# Patient Record
Sex: MALE | Race: WHITE
[De-identification: names, ages, dates, MRNs, and addresses within clinical notes are randomized per-mention and may not be internally consistent; named-entity substitution may affect disease eponyms.]

---

## 2020-04-24 ENCOUNTER — HOSPITAL ENCOUNTER (INPATIENT)
Dept: HOSPITAL 97 - ER | Age: 85
LOS: 2 days | Discharge: HOME | DRG: 871 | End: 2020-04-26
Attending: FAMILY MEDICINE | Admitting: HOSPITALIST
Payer: COMMERCIAL

## 2020-04-24 VITALS — BODY MASS INDEX: 23.6 KG/M2

## 2020-04-24 DIAGNOSIS — N28.89: ICD-10-CM

## 2020-04-24 DIAGNOSIS — N18.9: ICD-10-CM

## 2020-04-24 DIAGNOSIS — Z79.82: ICD-10-CM

## 2020-04-24 DIAGNOSIS — R79.89: ICD-10-CM

## 2020-04-24 DIAGNOSIS — Z88.1: ICD-10-CM

## 2020-04-24 DIAGNOSIS — N17.9: ICD-10-CM

## 2020-04-24 DIAGNOSIS — D72.829: ICD-10-CM

## 2020-04-24 DIAGNOSIS — A41.9: Primary | ICD-10-CM

## 2020-04-24 DIAGNOSIS — Z86.73: ICD-10-CM

## 2020-04-24 DIAGNOSIS — I48.20: ICD-10-CM

## 2020-04-24 DIAGNOSIS — J18.9: ICD-10-CM

## 2020-04-24 DIAGNOSIS — J81.1: ICD-10-CM

## 2020-04-24 DIAGNOSIS — D64.9: ICD-10-CM

## 2020-04-24 DIAGNOSIS — I16.1: ICD-10-CM

## 2020-04-24 DIAGNOSIS — Z20.828: ICD-10-CM

## 2020-04-24 DIAGNOSIS — R65.21: ICD-10-CM

## 2020-04-24 DIAGNOSIS — R31.9: ICD-10-CM

## 2020-04-24 LAB
ALBUMIN SERPL BCP-MCNC: 2.7 G/DL (ref 3.4–5)
ALP SERPL-CCNC: 68 U/L (ref 45–117)
ALT SERPL W P-5'-P-CCNC: 15 U/L (ref 12–78)
AST SERPL W P-5'-P-CCNC: 31 U/L (ref 15–37)
BLD SMEAR INTERP: (no result)
BUN BLD-MCNC: 19 MG/DL (ref 7–18)
COHGB MFR BLDA: 1.7 % (ref 0–1.5)
GLUCOSE SERPLBLD-MCNC: 119 MG/DL (ref 74–106)
HCT VFR BLD CALC: 32.8 % (ref 39.6–49)
INR BLD: 1.23
LIPASE SERPL-CCNC: 82 U/L (ref 73–393)
LYMPHOCYTES # SPEC AUTO: 0.5 K/UL (ref 0.7–4.9)
MAGNESIUM SERPL-MCNC: 1.6 MG/DL (ref 1.8–2.4)
MORPHOLOGY BLD-IMP: (no result)
OXYHGB MFR BLDA: 97.3 % (ref 94–97)
PMV BLD: 8.7 FL (ref 7.6–11.3)
POTASSIUM SERPL-SCNC: 4 MMOL/L (ref 3.5–5.1)
RBC # BLD: 3.68 M/UL (ref 4.33–5.43)
SAO2 % BLDA: 99.6 % (ref 92–98.5)
TROPONIN (EMERG DEPT USE ONLY): 0.06 NG/ML (ref 0–0.04)
UA COMPLETE W REFLEX CULTURE PNL UR: (no result)

## 2020-04-24 PROCEDURE — 84443 ASSAY THYROID STIM HORMONE: CPT

## 2020-04-24 PROCEDURE — 80053 COMPREHEN METABOLIC PANEL: CPT

## 2020-04-24 PROCEDURE — 85610 PROTHROMBIN TIME: CPT

## 2020-04-24 PROCEDURE — 83605 ASSAY OF LACTIC ACID: CPT

## 2020-04-24 PROCEDURE — 84100 ASSAY OF PHOSPHORUS: CPT

## 2020-04-24 PROCEDURE — 93005 ELECTROCARDIOGRAM TRACING: CPT

## 2020-04-24 PROCEDURE — 85018 HEMOGLOBIN: CPT

## 2020-04-24 PROCEDURE — 85730 THROMBOPLASTIN TIME PARTIAL: CPT

## 2020-04-24 PROCEDURE — 96372 THER/PROPH/DIAG INJ SC/IM: CPT

## 2020-04-24 PROCEDURE — 80076 HEPATIC FUNCTION PANEL: CPT

## 2020-04-24 PROCEDURE — 97161 PT EVAL LOW COMPLEX 20 MIN: CPT

## 2020-04-24 PROCEDURE — 76770 US EXAM ABDO BACK WALL COMP: CPT

## 2020-04-24 PROCEDURE — 83880 ASSAY OF NATRIURETIC PEPTIDE: CPT

## 2020-04-24 PROCEDURE — 36415 COLL VENOUS BLD VENIPUNCTURE: CPT

## 2020-04-24 PROCEDURE — 82805 BLOOD GASES W/O2 SATURATION: CPT

## 2020-04-24 PROCEDURE — 83735 ASSAY OF MAGNESIUM: CPT

## 2020-04-24 PROCEDURE — 81003 URINALYSIS AUTO W/O SCOPE: CPT

## 2020-04-24 PROCEDURE — 87086 URINE CULTURE/COLONY COUNT: CPT

## 2020-04-24 PROCEDURE — 83690 ASSAY OF LIPASE: CPT

## 2020-04-24 PROCEDURE — 82550 ASSAY OF CK (CPK): CPT

## 2020-04-24 PROCEDURE — 84132 ASSAY OF SERUM POTASSIUM: CPT

## 2020-04-24 PROCEDURE — 70450 CT HEAD/BRAIN W/O DYE: CPT

## 2020-04-24 PROCEDURE — 84484 ASSAY OF TROPONIN QUANT: CPT

## 2020-04-24 PROCEDURE — 8E0ZXY6 ISOLATION: ICD-10-PCS

## 2020-04-24 PROCEDURE — 84145 PROCALCITONIN (PCT): CPT

## 2020-04-24 PROCEDURE — 85014 HEMATOCRIT: CPT

## 2020-04-24 PROCEDURE — 87088 URINE BACTERIA CULTURE: CPT

## 2020-04-24 PROCEDURE — 84439 ASSAY OF FREE THYROXINE: CPT

## 2020-04-24 PROCEDURE — 81015 MICROSCOPIC EXAM OF URINE: CPT

## 2020-04-24 PROCEDURE — 97530 THERAPEUTIC ACTIVITIES: CPT

## 2020-04-24 PROCEDURE — 85379 FIBRIN DEGRADATION QUANT: CPT

## 2020-04-24 PROCEDURE — 93970 EXTREMITY STUDY: CPT

## 2020-04-24 PROCEDURE — 71045 X-RAY EXAM CHEST 1 VIEW: CPT

## 2020-04-24 PROCEDURE — 71275 CT ANGIOGRAPHY CHEST: CPT

## 2020-04-24 PROCEDURE — 85025 COMPLETE CBC W/AUTO DIFF WBC: CPT

## 2020-04-24 PROCEDURE — 94660 CPAP INITIATION&MGMT: CPT

## 2020-04-24 PROCEDURE — 99291 CRITICAL CARE FIRST HOUR: CPT

## 2020-04-24 PROCEDURE — 99292 CRITICAL CARE ADDL 30 MIN: CPT

## 2020-04-24 PROCEDURE — 80048 BASIC METABOLIC PNL TOTAL CA: CPT

## 2020-04-24 PROCEDURE — 87804 INFLUENZA ASSAY W/OPTIC: CPT

## 2020-04-24 PROCEDURE — 87040 BLOOD CULTURE FOR BACTERIA: CPT

## 2020-04-24 RX ADMIN — Medication SCH ML: at 21:59

## 2020-04-24 RX ADMIN — ACETAMINOPHEN SCH MG: 500 TABLET, FILM COATED ORAL at 22:06

## 2020-04-24 NOTE — RAD REPORT
EXAM DESCRIPTION:  CT - Head Brain Wo Cont - 4/24/2020 6:22 pm

 

CLINICAL HISTORY:  MENTAL STATUS CHANGE, AMS, fever, history of CVA

 

COMPARISON:  MRI BRAIN WITHOUT CONTRAST dated 6/11/2015

 

TECHNIQUE:  Axial 5 mm thick images of the head were obtained without IV contrast.

 

All CT scans are performed using dose optimization technique as appropriate and may include automated
 exposure control or mA/KV adjustment according to patient size.

 

FINDINGS:  No intracranial hemorrhage, mass, edema or shift of mid-line structures. No acute infarcti
on changes seen. No cortical edema or sulcal effacement. Moderate severity atrophy present. Chronic i
schemic changes are present relatively mild. Old ischemic changes are present in the posterolateral l
eft thalamus. This is similar to the MRI study. Ventricles are in proportion to volume loss. Vascular
 tortuosity noted. Arterial and physiologic calcifications present.

 

Mastoid air cells and visualized portions of the paranasal sinuses are clear.

 

No acute bony findings.

 

 

IMPRESSION:  No hemorrhage is present. No acute cortical based infarction.

 

Atrophy and chronic ischemic changes are present but not substantially different from 2015 MRI.

 

 Chronic ischemic changes can mask nonhemorrhagic acute infarction. MR brain followup can be obtained
 if there is ongoing concern for acute ischemia.

## 2020-04-24 NOTE — RAD REPORT
EXAM DESCRIPTION:  RAD - Chest Single View - 4/24/2020 3:32 pm

 

CLINICAL HISTORY:  FEVER

 

COMPARISON:  September 2006

 

TECHNIQUE:  AP portable chest image was obtained 4/24/2020 3:32 pm .

 

FINDINGS:  Lung volumes are low. Interstitial and minimal alveolar opacities are present. Cardiomegal
y is present. Vascular engorgement is seen. Minimal opacification partially obscures the right hemidi
aphragm. No pneumothorax or large pleural effusion. No acute bony abnormality seen. No acute aortic f
indings suspected.

 

IMPRESSION:  Limited shallow inspiration exam shows failure/ volume overload findings. This needs cor
relation with clinical presentation.

 

No focal consolidations seen. Right base assessment is limited. Retrocardiac left base assessment als
o limited.

## 2020-04-24 NOTE — RAD REPORT
EXAM DESCRIPTION:  CT - Chest For Pe Angio - 4/24/2020 6:22 pm

 

CLINICAL HISTORY:   elevated dd;Fever

 

COMPARISON:  No comparisons

 

TECHNIQUE:  Dynamically enhanced 3 mm thick images of the chest were obtained during administration o
f approximately 150mL Isovue 370 IV contrast. Coronal and oblique MIP reconstruction images were gene
rated and reviewed. Exam utilizes a protocol to evaluate the pulmonary arterial tree.

 

All CT scans are performed using dose optimization technique as appropriate and may include automated
 exposure control or mA/KV adjustment according to patient size.

 

FINDINGS:  No pulmonary emboli are identified.

 

Ascending aorta is 4.2 cm in diameter. Aortic arch distal to the left subclavian artery origin is dil
ated to 4.7 cm. No displaced calcifications. Full assessment of the aorta is limited on a PE protocol
 study.

 

 Cardiomegaly is present without pericardial effusion.

 

Emphysema changes are present in the upper lung fields. Minimal atelectasis in the posterior gutter o
n the left. Atelectasis and consolidation are present in the posterior gutter on the right. A few air
 bronchograms are present. No ground-glass opacification. No measurable pleural effusion. There is no
 pneumothorax.

 

No mediastinal or hilar suspicious masses. No chest wall masses or abnormal axillary lymphadenopathy.


Upper abdominal imaging is limited. There is a 4.9 centimeter round low-density mass believed to be a
n upper pole left renal cyst. A much larger 12 centimeter lobulated mass is present in the location o
f the right kidney displacing the liver. This has variable attenuation. The right kidney is insuffici
ently image to allow assessment. Most of the large lobulated mass is low density. This could be a dys
plastic multi-cystic kidney. A large malignancy cannot be excluded. This finding warrants follow-up. 
However, that follow-up may need to be delayed. The patient has received contrast for this study.

 

IMPRESSION:  No pulmonary emboli identified.

 

Small posterior right lung base pneumonia. Bacterial pneumonia suspected. Lung parenchymal pattern is
 not a typical COVID-19 presentation.

 

Large lobulated 12 centimeter mass in the right renal fossa only partially imaged. This could be in m
ulti-cystic dysplastic kidney. Malignancy cannot be excluded.

 

Follow-up CT abdomen imaging will be needed. However, this needs to be delayed for optimal imaging. T
he presence of contrast from this study limits the ability to perform intermediate evaluation of the 
kidney.

 

Cardiomegaly.

## 2020-04-24 NOTE — ER
Nurse's Notes                                                                                     

 [facility]                                                                 

Name: Brett Hernandez                                                                                 

Age: 84 yrs                                                                                       

Sex: Male                                                                                         

: 1935                                                                                   

MRN: [id number]                                                                                   

Arrival Date: 2020                                                                          

Time: 14:48                                                                                       

Account#: [id number]                                                                            

Bed 4                                                                                             

Private MD:                                                                                       

Diagnosis: Atrial Fibrillation with Rapid Ventricular rate;Fever, unspecified;Pneumonia in        

  diseases classified elsewhere;Unspecified combined systolic (congestive) and                    

  diastolic (congestive) heart failure;Respiratory failure;Hypertension                           

                                                                                                  

Presentation:                                                                                     

                                                                                             

14:48 Chief complaint: EMS states: Family called us due to pt being altered, normally A\T\Ox4   jl7

      and he's A\T\Ox2 to self and place; pt was SOB on arrival, 12-lead showed A-Fib with RVR,   

      10 mg Cardizem IVP administered over 2 min with no change; an additional 20 mg Cardizem     

      IVP administered over 10 min with no change. Coronavirus screen: Surgical mask placed       

      on patient. Patient moved to private room, placed in contact and droplet isolation with     

      eye protection until further assessment. Patient denies a cough. Patient reports            

      shortness of breath or difficulty breathing. Patient reports a measured and/or              

      subjective temperature greater than 100.4F. unable to obtain travel and contact history     

      at this time.                                                                               

14:48 Method Of Arrival: EMS: [facility]                                                jl7 

14:48 Ebola Screen: No symptoms or risks identified at this time. Initial Sepsis Screen: Does jl7 

      the patient meet any 2 criteria? RR > 20 per min. Temp <36.0*C (96.8*F)) or > 38.3*C        

      (100.9*F). Altered Mental Status. HR > 90 bpm. Yes Does the patient have a suspected        

      source of infection? Yes: Productive cough/pneumonia Dysuria/Frequency/Urgency/UTI.         

      Risk Assessment: Do you want to hurt yourself or someone else? Patient reports no           

      desire to harm self or others. Onset of symptoms is unknown. Care prior to arrival:         

      Medication(s) given: Normal saline infusion, 300 mL IV initiated. 20 GA, in the left        

      forearm, Oxygen administered. via a non-rebreather mask. Activity prior to arrival:         

      confused. Transition of care: patient was not received from another setting of care.        

16:31 Acuity: VITO 1                                                                           iw  

                                                                                                  

Triage Assessment:                                                                                

14:48 General: Appears distressed, uncomfortable, unkempt, Behavior is agitated, restless.    jl7 

      Pain: Unable to use pain scale. Does not appear to understand pain scale. Neuro: Level      

      of Consciousness is awake, confused, Oriented to person. Cardiovascular: Patient's skin     

      is warm and dry. Rhythm is atrial fibrillation with rapid ventricular response.             

      Respiratory: Airway is patent Respiratory effort is even, labored, Respiratory pattern      

      is symmetrical, tachypnea. Derm: Skin is dry, Skin is pale, Skin temperature is hot.        

                                                                                                  

Historical:                                                                                       

- Allergies:                                                                                      

16:37 Sulfa (Sulfonamide Antibiotics);                                                        iw  

- Home Meds:                                                                                      

16:38 aspirin 81 mg Oral chew 1 tab once daily [Active]; multivitamin Oral [Active];          iw  

- PMHx:                                                                                           

16:38 CVA;                                                                                    iw  

- PSHx:                                                                                           

16:38 Hernia repair;                                                                          iw  

                                                                                                  

- Immunization history:: Adult Immunizations unknown.                                             

- Social history:: Smoking status: unknown.                                                       

                                                                                                  

                                                                                                  

Screening:                                                                                        

15:00 Abuse screen: Denies threats or abuse. Denies injuries from another. Nutritional        jl7 

      screening: No deficits noted. Tuberculosis screening: No symptoms or risk factors           

      identified. Fall Risk No fall in past 12 months (0 pts). No secondary diagnosis (0          

      pts). IV access (20 points). Ambulatory Aid- None/Bed Rest/Nurse Assist (0 pts). Gait-      

      Weak (10 pts.). Mental Status- Overestimates/Forgets Limitations (15 pts.). Total Nguyễn     

      Fall Scale indicates High Risk Score (45 or more points). Fall prevention measures have     

      been instituted. Side Rails Up X 2 Placed Close to Nursing Station Frequent                 

      Obs/Assessments Occuring As available patient and family educated on Fall Prevention        

      Program and Strategies.                                                                     

                                                                                                  

Assessment:                                                                                       

16:10 Reassessment: Reassessment: Pt has increased restlessness and agitation, audible        jl7 

      wheezing noted. TAMAR Smith and Dr. Gorman notified and at bedside. Respiratory: Airway is       

      patent Respiratory effort is labored, with retractions, Respiratory pattern is              

      tachypnea.                                                                                  

16:30 Reassessment: Dr. Gorman and TAMAR Smith at beside to reassess, RT called to bedside to      iw  

      place pt on BiPAP.                                                                          

16:44 Reassessment: spoke with wife Asia on phone, states that pt is a Full Code and has   iw  

      no hx of Afib, not on any anticoagulation, only takes a baby aspirin daily since he had     

      a stroke five years ago.                                                                    

17:40 Reassessment: pt placed on cooling blanket for temp of 105.1, pt cleaned of stool,      iw  

      linens changed, pt remains on BiPAP, appears more relaxed BP down to 112/72, HR down to     

      92 , QyN5=970%.                                                                             

17:40 Reassessment: IV to LFA appears to be infiltrated, new IV started to LAC.               iw  

18:41 Reassessment: Pt transport to CT via stretcher with SONI Glass and ARPIT Brooks.   7 

      Pt place on 2 lpm NC; A\T\O x 3 at this time; pt appeared to start getting restless with    

      NC on, once returned from CT and BiPap placed back on pt he appeared to settle down and     

      appears more comfortable at this time.                                                      

19:58 Reassessment: DR DUNHAM ASSESSED THE PATIENT AT ROOM 4 AND DISCONNECTED TO BIPAP MACHINE. rv  

      PATIENT IS STABLE AT 2 NC, SAT AT 99%.                                                      

21:00 Neuro: Level of Consciousness is awake, alert, obeys commands, Oriented to person,      rv  

      place, situation.                                                                           

21:00 Cardiovascular: Rhythm is atrial fibrillation with rapid ventricular response.          rv  

                                                                                                  

Vital Signs:                                                                                      

14:48  / 75; Pulse 120; Resp 21 S; Pulse Ox 85% on R/A;                                 jl7 

14:54 Weight 90.72 kg;                                                                        vc  

15:00  / 78; Pulse 107; Resp 19; Pulse Ox 100% on Non-rebreather mask;                  jl7 

15:30  / 101; Pulse 102; Resp 25 S; Pulse Ox 91% on 2 lpm NC;                           jl7 

16:08  / 96; Pulse 157; Resp 34 S; Temp 102.5(C); Pulse Ox 86% on 2 lpm NC;             jl7 

16:12  / 96; Pulse 157; Resp 30 S; Pulse Ox 94% on 2 lpm NC;                            jl7 

16:43  / 101; Pulse 115; Resp 39; Temp 104.6(C); Pulse Ox 99% on BiPAP;                 iw  

16:47  / 85; Pulse 108; Resp 30 A; Temp 104.8(C); Pulse Ox 99% on BiPAP;                jl7 

16:50  / 71; Pulse 98; Resp 27; Temp 105; Pulse Ox 99% ;                                jl7 

17:00  / 64; Pulse 92; Resp 26; Temp 105.1; Pulse Ox 99% ;                              jl7 

17:15  / 87; Pulse 97; Resp 26; Temp 104.7; Pulse Ox 100% ;                             jl7 

17:30  / 71; Pulse 101; Resp 21 A; Temp 104.1(C); Pulse Ox 100% on BiPAP;               jl7 

17:45  / 72; Pulse 89; Resp 22; Pulse Ox 99% ;                                          jl7 

18:00  / 76; Pulse 96; Resp 22; Temp 103.7; Pulse Ox 100% ;                             jl7 

18:15  / 103; Pulse 111; Resp 24; Pulse Ox 95% on 2 lpm NC;                             jl7 

18:30  / 83; Pulse 96; Resp 21; Temp 101.9; Pulse Ox 96% ;                              jl7 

18:45  / 85; Pulse 100; Resp 21; Pulse Ox 97% ;                                         jl7 

19:00  / 78; Pulse 93; Resp 19; Pulse Ox 98% on 30% BiPAP;                              rv  

19:30  / 82; Pulse 90; Resp 27; Temp 101; Pulse Ox 99% on 30% BiPAP;                    rv  

20:00  / 95; Pulse 94; Resp 19; Temp 100.6; Pulse Ox 99% on 30% BiPAP;                  rv  

20:30  / 106; Pulse 101; Resp 25; Temp 100.2; Pulse Ox 97% on 2 lpm NC;                 rv  

20:30  / 106; Pulse 101; Resp 25; Temp 100.4; Pulse Ox 97% on 2 lpm NC;                 rv  

21:00  / 103; Pulse 103; Resp 24; Temp 100.4; Pulse Ox 97% on 2 lpm NC;                 rv  

                                                                                                  

ED Course:                                                                                        

14:48 Patient arrived in ED.                                                                  iw  

14:48 Luis Leblanc PA is PHCP.                                                               jr8 

14:48 Marcos Gorman DO is Attending Physician.                                                jr8 

15:00 Patient has correct armband on for positive identification. Placed in gown. Bed in low  jl7 

      position. Call light in reach. Side rails up X2. Cardiac monitor on. Pulse ox on. NIBP      

      on.                                                                                         

15:05 First set of blood cultures drawn by me.                                                jl7 

15:11 Initial lab(s) drawn, by me, sent to lab. Inserted saline lock: 20 gauge in right       jl7 

      wrist, using aseptic technique. Blood collected.                                            

15:11 Second set of blood cultures drawn by me, Flu and/or RSV swab sent to lab. Covid-19     jl7 

      swab sent to lab.                                                                           

15:33 Chest Single View XRAY In Process Unspecified.                                          EDMS

15:50 Cleaned of incontinence.                                                                jl7 

15:57 Notified Nurse Practitioner and/or Physician Assistant of a critical lab result(s),     sv  

      [id number].                                                                               

16:00 Speci-cath kit inserted, using sterile technique, 16 Fr., specimen obtained. returned   jl7 

      clear yellow urine. Patient tolerated well.                                                 

16:31 Triage completed.                                                                       iw  

17:08 Cecilia Sanchez, RN is Primary Nurse.                                                      jl7 

17:30 Cleaned of incontinence.                                                                jl7 

17:57 Inserted saline lock: 20 gauge in left antecubital area, using aseptic technique.       iw  

18:22 CT Head Brain wo Cont In Process Unspecified.                                           EDMS

18:22 CT Chest For PE Angio In Process Unspecified.                                           EDMS

19:17 Maria Elena Dunham MD is Hospitalizing Provider.                                           ms3 

19:38 Primary Nurse role handed off by Cecilia Sanchez, RN                                       jl7 

19:58 Cruzito Tyson, RN is Primary Nurse.                                                  rv  

20:28 a voicemail has been left at the listed number [phone] for a call back to the ED,  sg  

      awaiting a call back at this time.                                                          

21:26 No provider procedures requiring assistance completed. Patient admitted, IV remains in  vc  

      place.                                                                                      

                                                                                                  

Administered Medications:                                                                         

16:00 Drug: Acetaminophen Suppository 650 mg Route: UT;                                       jl7 

17:30 Follow up: Response: No adverse reaction; Temperature is decreased                      jl7 

16:10 Drug: NS 0.9% (30 ml/kg) 30 ml/kg Route: IV; Rate: bolus; Site: right wrist;            jl7 

16:45 Follow up: IV Status: Completed infusion; Order to discontinue infusion; IV Intake:     jl7 

      500ml                                                                                       

16:10 Drug: Lopressor 5 mg Route: IVP; Site: right wrist;                                     jl7 

16:12 Drug: Lovenox 1 mg/kg Route: Sub-Q; Site: abdomen;                                      jl7 

18:42 Follow up: Response: No adverse reaction                                                jl7 

16:20 Drug: Lopressor 5 mg Route: IVP; Site: right wrist;                                     jl7 

16:45 Follow up: Response: No adverse reaction; Cardiac rhythm changed                        jl7 

16:22 Drug: Lasix 60 mg Route: IVP; Site: right wrist;                                        jl7 

17:00 Follow up: Response: No adverse reaction                                                jl7 

16:36 Drug: Nitro Drip - (Nitroglycerin 50 mg, D5W 250 ml) Route: IV; Rate: 100 mcg/min;      jl7 

      Site: right wrist;                                                                          

16:50 Follow up: Response: No adverse reaction; IV Status: Order to discontinue infusion      jl7 

17:15 Drug: Magnesium Sulfate 1 grams Route: IVPB; Infused Over: 1 hrs; Site: right           iw  

      antecubital;                                                                                

18:15 Follow up: Response: No adverse reaction; IV Status: Completed infusion                 jl7 

17:30 Drug: Zosyn 3.375 grams Route: IVPB; Infused Over: 60 mins; Site: left antecubital;     iw  

18:30 Follow up: Response: No adverse reaction; IV Status: Completed infusion                 jl7 

19:20 Drug: Rocephin 1 grams Route: IV; Rate: calculated rate; Site: left antecubital;        rv  

19:25 Follow up: IV Status: Completed infusion                                                rv  

19:25 Drug: vancoMYCIN 1 grams Route: IVPB; Infused Over: 2 hrs; Site: left antecubital;      rv  

21:37 Follow up: IV Status: Infusion continued upon admission                                 rv  

                                                                                                  

                                                                                                  

Intake:                                                                                           

16:45 IV: 500ml; Total: 500ml.                                                                7 

                                                                                                  

Outcome:                                                                                          

19:21 Decision to Hospitalize by Provider.                                                    ms3 

21:27 Patient left the ED.                                                                    vc  

                                                                                                  

Signatures:                                                                                       

Dispatcher [employer]                           EDMS                                                 

Mimi Shepard RN RN sv Gay, Steven RN                         Mae Dobson RN RN                                                      

Luis Leblanc, Cecilia Mann RN RN   jl7                                                  

Cruzito Tyson RN RN   rv                                                   

Hilaria Caceres RN RN                                                      

Marcos Gorman DO DO   ms3                                                  

                                                                                                  

Corrections: (The following items were deleted from the chart)                                    

17:55 16:57  / 71; Pulse 95bpm; Resp 20bpm; Spontaneous; Pulse Ox 99% BiPAP; Temp       jl7 

      105.1F Catheter; iw                                                                         

17:55 17:49  / 72; Pulse 92bpm; Resp 22bpm; Spontaneous; Pulse Ox 100% BiPAP; Temp      jl7 

      103.2F Catheter; iw                                                                         

17:56 17:52 Reassessment: pt placed on cooling blanket for temp of 105.1, pt cleaned of       iw  

      stool, linens changed, pt remains on BiPAP, appears more relaxed BP down to 112/72, HR      

      down to 92 , TzV9=457% iw                                                                   

19:26 16:10 Reassessment: celina                                                                 jl7 

                                                                                                  

**************************************************************************************************

## 2020-04-24 NOTE — EDPHYS
Physician Documentation                                                                           

 [facility]                                                                 

Name: Brett Hernandez                                                                                 

Age: 84 yrs                                                                                       

Sex: Male                                                                                         

: 1935                                                                                   

MRN: [id number]                                                                                   

Arrival Date: 2020                                                                          

Time: 14:48                                                                                       

Account#: [id number]                                                                            

Bed 4                                                                                             

Private MD:                                                                                       

ED Physician Marcos Gorman                                                                         

HPI:                                                                                              

                                                                                             

16:59 This 84 yrs old  Male presents to ER via Unassigned with complaints of Altered jr8 

      Mental Status, Fever.                                                                       

16:59 Onset: The symptoms/episode began/occurred acutely, today. Possible causes: unknown.    jr8 

      Associated signs and symptoms: Pertinent positives: confusion. Current symptoms: In the     

      emergency department the patient's symptoms are unchanged from the initial                  

      presentation. Patient's baseline: Neuro: alert and fully oriented, Motor: no deficits,      

      Ambulation: walks with assist only, Speech: normal. It is unknown whether or not the        

      patient has had similar symptoms in the past. The patient has not recently seen a           

      physician. EMS called out by family for altered patient. Patient found to have fever at     

      home. No other complaints .                                                                 

                                                                                                  

Historical:                                                                                       

- Allergies:                                                                                      

16:37 Sulfa (Sulfonamide Antibiotics);                                                        iw  

- Home Meds:                                                                                      

16:38 aspirin 81 mg Oral chew 1 tab once daily [Active]; multivitamin Oral [Active];          iw  

- PMHx:                                                                                           

16:38 CVA;                                                                                    iw  

- PSHx:                                                                                           

16:38 Hernia repair;                                                                          iw  

                                                                                                  

- Immunization history:: Adult Immunizations unknown.                                             

- Social history:: Smoking status: unknown.                                                       

                                                                                                  

                                                                                                  

ROS:                                                                                              

17:09 Eyes: Negative for injury, pain, redness, and discharge, ENT: Negative for injury,      jr8 

      pain, and discharge, Neck: Negative for injury, pain, and swelling, Cardiovascular:         

      Negative for chest pain, palpitations, and edema, Abdomen/GI: Negative for abdominal        

      pain, nausea, vomiting, diarrhea, and constipation, Back: Negative for injury and pain,     

      MS/Extremity: Negative for injury and deformity, Skin: Negative for injury, rash, and       

      discoloration.                                                                              

17:09 Respiratory: Positive for shortness of breath.                                              

17:09 Neuro: Positive for altered mental status.                                                  

                                                                                                  

Exam:                                                                                             

17:09 Eyes:  Pupils equal round and reactive to light, extra-ocular motions intact.  Lids and jr8 

      lashes normal.  Conjunctiva and sclera are non-icteric and not injected.  Cornea within     

      normal limits.  Periorbital areas with no swelling, redness, or edema. Neck:  Trachea       

      midline, no thyromegaly or masses palpated, and no cervical lymphadenopathy.  Supple,       

      full range of motion without nuchal rigidity, or vertebral point tenderness.  No            

      Meningismus. Abdomen/GI:  Soft, non-tender, with normal bowel sounds.  No distension or     

      tympany.  No guarding or rebound.  No evidence of tenderness throughout. Back:  No          

      spinal tenderness.  No costovertebral tenderness.  Full range of motion. Skin:  Warm,       

      dry with normal turgor.  Normal color with no rashes, no lesions, and no evidence of        

      cellulitis.                                                                                 

17:09 MS/ Extremity:  Pulses equal, no cyanosis.  Neurovascular intact.  Full, normal range       

      of motion.                                                                                  

17:09 Cardiovascular: Rate: tachycardic, Rhythm: irregularly irregular, Pulses: Pulses are 2+     

      in right radial artery and left radial artery. Heart sounds: normal, Edema: is not          

      appreciated, JVD: is not appreciated.                                                       

17:09 Respiratory: the patient does not display signs of respiratory distress,  Respirations:     

      tachypnea, that is mild, Breath sounds: decreased breath sounds, that are mild, are         

      located in both bases.                                                                      

17:09 Neuro: Orientation: to person, Not oriented to place, time, situation, Mentation: able      

      to follow commands, confused.                                                               

17:18 ECG was reviewed by the Attending Physician.                                            jr8 

                                                                                                  

Vital Signs:                                                                                      

14:48  / 75; Pulse 120; Resp 21 S; Pulse Ox 85% on R/A;                                 jl7 

14:54 Weight 90.72 kg;                                                                        vc  

15:00  / 78; Pulse 107; Resp 19; Pulse Ox 100% on Non-rebreather mask;                  jl7 

15:30  / 101; Pulse 102; Resp 25 S; Pulse Ox 91% on 2 lpm NC;                           jl7 

16:08  / 96; Pulse 157; Resp 34 S; Temp 102.5(C); Pulse Ox 86% on 2 lpm NC;             jl7 

16:12  / 96; Pulse 157; Resp 30 S; Pulse Ox 94% on 2 lpm NC;                            jl7 

16:43  / 101; Pulse 115; Resp 39; Temp 104.6(C); Pulse Ox 99% on BiPAP;                 iw  

16:47  / 85; Pulse 108; Resp 30 A; Temp 104.8(C); Pulse Ox 99% on BiPAP;                jl7 

16:50  / 71; Pulse 98; Resp 27; Temp 105; Pulse Ox 99% ;                                jl7 

17:00  / 64; Pulse 92; Resp 26; Temp 105.1; Pulse Ox 99% ;                              jl7 

17:15  / 87; Pulse 97; Resp 26; Temp 104.7; Pulse Ox 100% ;                             jl7 

17:30  / 71; Pulse 101; Resp 21 A; Temp 104.1(C); Pulse Ox 100% on BiPAP;               jl7 

17:45  / 72; Pulse 89; Resp 22; Pulse Ox 99% ;                                          jl7 

18:00  / 76; Pulse 96; Resp 22; Temp 103.7; Pulse Ox 100% ;                             jl7 

18:15  / 103; Pulse 111; Resp 24; Pulse Ox 95% on 2 lpm NC;                             jl7 

18:30  / 83; Pulse 96; Resp 21; Temp 101.9; Pulse Ox 96% ;                              jl7 

18:45  / 85; Pulse 100; Resp 21; Pulse Ox 97% ;                                         jl7 

19:00  / 78; Pulse 93; Resp 19; Pulse Ox 98% on 30% BiPAP;                              rv  

19:30  / 82; Pulse 90; Resp 27; Temp 101; Pulse Ox 99% on 30% BiPAP;                    rv  

20:00  / 95; Pulse 94; Resp 19; Temp 100.6; Pulse Ox 99% on 30% BiPAP;                  rv  

20:30  / 106; Pulse 101; Resp 25; Temp 100.2; Pulse Ox 97% on 2 lpm NC;                 rv  

20:30  / 106; Pulse 101; Resp 25; Temp 100.4; Pulse Ox 97% on 2 lpm NC;                 rv  

21:00  / 103; Pulse 103; Resp 24; Temp 100.4; Pulse Ox 97% on 2 lpm NC;                 rv  

                                                                                                  

MDM:                                                                                              

14:48 Patient medically screened.                                                             jr8 

16:51 ED course: Pt with increased work of breathing and increased HR. Pt hypertensive MAP    ms3 

      124 (200/110), pt with likely COVID and possible CHF. Bipap started at 15/8. Nitro ggt      

      started at 100 mcg/min, pt MAP of 97. Nitro decreased to 50 mcg/min. Pt MAP then            

      decreased to 87 and Nitro ggt stopped. Pt work of breathing improved. Will continue to      

      monitor patient closely..                                                                   

17:09 Data reviewed: vital signs, nurses notes, lab test result(s), EKG, radiologic studies,  [facility] 

      plain films. Data interpreted: Pulse oximetry: on 4L(s) per nasal canula, is 88 %.          

      Interpretation: hypoxia. Counseling: I had a detailed discussion with the patient           

      and/or guardian regarding: the historical points, exam findings, and any diagnostic         

      results supporting the discharge/admit diagnosis, lab results, radiology results, the       

      need for further work-up and treatment in the hospital. ED course: Patient acutely          

      decompensated in ED. Went into Atrial Fib RVR with diffuse wheezing and acute               

      respiratory distress. Patient was given lasix, BIPAP, and nitro, along with another 5       

      mg of metoprolol. Patient within about 10 min stabilized and is now doing much better.      

17:17 ED course: Discussed care with family. Patient to remain full code. As far as family    [facility] 

      knows. No HF history of atrial fib .                                                        

                                                                                                  

                                                                                             

14:50 Order name: ABG; Complete Time: 15:38                                                                                                                                                

14:50 Order name: D-Dimer; Complete Time: 16:                                               [facility] 

                                                                                             

14:50 Order name: Urine Culture                                                                                                                                                            

14:50 Order name: Basic Metabolic Panel; Complete Time: 16:                                 [facility] 

                                                                                             

14:50 Order name: Blood Culture Adult (2)                                                     [facility] 

                                                                                             

14:50 Order name: CBC with Diff; Complete Time: 19:03                                         [facility] 

                                                                                             

14:50 Order name: CPK; Complete Time: 16:55                                                   [facility] 

                                                                                             

14:50 Order name: Lactate; Complete Time: 16:55                                               [facility] 

                                                                                             

14:50 Order name: LFT's; Complete Time: 16:55                                                 [facility] 

                                                                                             

14:50 Order name: Lipase; Complete Time: 16:55                                                [facility] 

                                                                                             

14:50 Order name: Procalcitonin; Complete Time: 16:55                                         [facility] 

                                                                                             

14:50 Order name: Protime (+inr); Complete Time: 16:55                                        [facility] 

                                                                                             

14:50 Order name: Ptt, Activated; Complete Time: 16:                                        [facility] 

                                                                                             

14:50 Order name: Troponin (emerg Dept Use Only); Complete Time: 16:55                                                                                                                     

14:50 Order name: Urine Microscopic Only; Complete Time: 16:55                                                                                                                             

14:50 Order name: COVID-19                                                                     

                                                                                             

14:50 Order name: BNP; Complete Time: 16:55                                                                                                                                                

14:50 Order name: Magnesium; Complete Time: 16:55                                             [facility] 

                                                                                             

15:17 Order name: Flu; Complete Time: 16:55                                                   iw  

                                                                                             

16:44 Order name: Urine Dipstick--Ancillary (enter results); Complete Time: 16:55             eb  

                                                                                             

18:30 Order name: CBC Smear Scan; Complete Time: 19:03                                        [facility]

                                                                                             

20:09 Order name: Comprehensive Metabolic Panel                                               MS

                                                                                             

20:09 Order name: Comprehensive Metabolic Panel                                               MS

                                                                                             

20:09 Order name: Lactate                                                                     [facility]

                                                                                             

20:09 Order name: Lactate                                                                     [facility]MS

                                                                                             

20:09 Order name: Magnesium                                                                   [facility]MS

                                                                                             

20:09 Order name: Magnesium                                                                   EDMS

                                                                                             

20:09 Order name: NT PRO-BNP                                                                  MS

                                                                                             

20:09 Order name: NT PRO-BNP                                                                  MS

                                                                                             

20:09 Order name: Phosphorus                                                                  EDMS

                                                                                             

14:50 Order name: Cath; Complete Time: 17:09                                                                                                                                               

14:50 Order name: Chest Single View XRAY; Complete Time: 17:21                                                                                                                             

14:50 Order name: Accucheck; Complete Time: 17:23                                                                                                                                          

14:50 Order name: Cardiac monitoring; Complete Time: 17:                                                                                             

14:50 Order name: EKG - Nurse/Tech; Complete Time: 17:23                                                                                                                                   

14:50 Order name: IV Saline Lock - Large Bore; Complete Time: 17:23                                                                                                                        

14:50 Order name: Labs collected and sent; Complete Time: 17:                                                                                             

14:50 Order name: O2 Per Protocol; Complete Time: 17:23                                                                                                                                    

14:50 Order name: O2 Sat Monitoring; Complete Time: 17:23                                                                                                                                  

14:50 Order name: Urine Dipstick-Ancillary (obtain specimen); Complete Time: 17:23                                                                                                         

17:07 Order name: BIPAP                                                                       jr8 

                                                                                             

17:27 Order name: CT Head Brain wo Cont; Complete Time: 19:03                                 jr8 

                                                                                             

17:27 Order name: CT Chest For PE Angio; Complete Time: 19:03                                 jr8 

                                                                                             

20:09 Order name: Phosphorus                                                                  EDMS

                                                                                             

20:09 Order name: Procalcitonin                                                               EDMS

                                                                                             

20:09 Order name: Procalcitonin                                                               EDMS

                                                                                             

20:11 Order name: Heart Healthy                                                               EDMS

                                                                                             

20:11 Order name: CBC with Automated Diff                                                     EDMS

                                                                                             

20:11 Order name: CBC with Automated Diff                                                     EDMS

                                                                                             

20:11 Order name: Troponin I                                                                  EDMS

                                                                                             

20:11 Order name: Troponin I                                                                  EDMS

                                                                                             

20:11 Order name: Troponin I                                                                  EDMS

                                                                                                  

EC:18 Rate is 115 beats/min. Rhythm is irregularly irregular, A fib. Left axis deviation      jr8 

      noted. QRS interval is normal at 88 msec. QT interval is normal at 450 msec. No Q           

      waves. T waves are Normal. No ST changes noted. Clinical impression: Atrial                 

      Fibrillation. Interpreted by me. Reviewed by me.                                            

                                                                                                  

Administered Medications:                                                                         

16:00 Drug: Acetaminophen Suppository 650 mg Route: MS;                                       jl7 

17:30 Follow up: Response: No adverse reaction; Temperature is decreased                      jl7 

16:10 Drug: NS 0.9% (30 ml/kg) 30 ml/kg Route: IV; Rate: bolus; Site: right wrist;            jl7 

16:45 Follow up: IV Status: Completed infusion; Order to discontinue infusion; IV Intake:     jl7 

      500ml                                                                                       

16:10 Drug: Lopressor 5 mg Route: IVP; Site: right wrist;                                     jl7 

16:12 Drug: Lovenox 1 mg/kg Route: Sub-Q; Site: abdomen;                                      jl7 

18:42 Follow up: Response: No adverse reaction                                                jl7 

16:20 Drug: Lopressor 5 mg Route: IVP; Site: right wrist;                                     jl7 

16:45 Follow up: Response: No adverse reaction; Cardiac rhythm changed                        jl7 

16:22 Drug: Lasix 60 mg Route: IVP; Site: right wrist;                                        jl7 

17:00 Follow up: Response: No adverse reaction                                                jl7 

16:36 Drug: Nitro Drip - (Nitroglycerin 50 mg, D5W 250 ml) Route: IV; Rate: 100 mcg/min;      jl7 

      Site: right wrist;                                                                          

16:50 Follow up: Response: No adverse reaction; IV Status: Order to discontinue infusion      jl7 

17:15 Drug: Magnesium Sulfate 1 grams Route: IVPB; Infused Over: 1 hrs; Site: right           iw  

      antecubital;                                                                                

18:15 Follow up: Response: No adverse reaction; IV Status: Completed infusion                 jl7 

17:30 Drug: Zosyn 3.375 grams Route: IVPB; Infused Over: 60 mins; Site: left antecubital;     iw  

18:30 Follow up: Response: No adverse reaction; IV Status: Completed infusion                 jl7 

19:20 Drug: Rocephin 1 grams Route: IV; Rate: calculated rate; Site: left antecubital;        rv  

19:25 Follow up: IV Status: Completed infusion                                                rv  

19:25 Drug: vancoMYCIN 1 grams Route: IVPB; Infused Over: 2 hrs; Site: left antecubital;      rv  

21:37 Follow up: IV Status: Infusion continued upon admission                                 rv  

                                                                                                  

                                                                                                  

Disposition:                                                                                      

17:17 Critical Care:.                                                                         ghislaine 

18:22 Co-signature as Attending Physician, Marcos Gorman DO I agree with the assessment and     ms3 

      plan of care. PA/NP's history reviewed, patient interviewed, and examined. HPI: 83 yo       

      male presents w/ tachycardia, fever, ams, a fib with RVR via [facility]. My personal exam of     

      patient reveals: Pt with tachypnea and respiratory distress on arrival, Pulse               

      irregularly irregular, tachycardic. Pt with confusion. I agree with assessment and care     

      plan and confirm the diagnosis (es) above.                                                  

                                                                                                  

Disposition:                                                                                      

20 19:21 Hospitalization ordered by Maria Elena Dunham for Inpatient Admission. Preliminary     

  diagnosis are Atrial Fibrillation with Rapid Ventricular rate, Fever,                           

  unspecified, Pneumonia in diseases classified elsewhere, Unspecified combined                   

  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                     

  failure, Hypertension.                                                                          

- Bed requested for Intensive Care Unit.                                                          

- Status is Inpatient Admission.                                                              vc  

- Condition is Stable.                                                                            

- Problem is new.                                                                                 

- Symptoms have improved.                                                                         

                                                                                                  

                                                                                                  

                                                                                                  

Critical care time excluding procedures:                                                          

17:17 Critical care time: Bedside Care: 20 minutes, Consultation: 10 minutes, Family          [id number] 

      Intervention: 5 minutes. Total time: 35 minutes                                             

                                                                                                  

Addendum:                                                                                         

2020                                                                                        

     10:50 Addendum: fluids were held after patient had flash pulmonary edema. Abx delayed as we   j
r8

           initially suspected a possibility of COVID related viral pneumonia. .                  

                                                                                                  

Signatures:                                                                                       

Dispatcher [employer]                           EDMimi Morales, RN                    Hattie Rueda RN                        Mae Rosario, RN                     RN   Luis Rojo PA PA   jr8                                                  

Cecilia Sanchez, ARPIT                        RN   jl7                                                  

Cruzito Tyson RN RN   rv                                                   

Hilaria Caceres RN RN vc Sims, Marcus, DO                        DO   ms3                                                  

                                                                                                  

Corrections: (The following items were deleted from the chart)                                    

                                                                                             

20:22 19:21 Hospitalization Ordered by Maria Elena Dunham MD for Inpatient Admission. Preliminary  dw  

      diagnosis is Atrial Fibrillation with Rapid Ventricular rate; Fever, unspecified;           

      Pneumonia in diseases classified elsewhere; Unspecified combined systolic (congestive)      

      and diastolic (congestive) heart failure; Respiratory failure; Hypertension. Bed            

      requested for Intensive Care Unit. Status is Inpatient Admission. Condition is Stable.      

      Problem is new. Symptoms have improved. ms3                                                 

21:27 20:22 2020 19:21 Hospitalization Ordered by Maria Elena Dunham MD for Inpatient        vc  

      Admission. Preliminary diagnosis is Atrial Fibrillation with Rapid Ventricular rate;        

      Fever, unspecified; Pneumonia in diseases classified elsewhere; Unspecified combined        

      systolic (congestive) and diastolic (congestive) heart failure; Respiratory failure;        

      Hypertension. Bed requested for Intensive Care Unit. Status is Inpatient Admission.         

      Condition is Stable. Problem is new. Symptoms have improved. dw                             

                                                                                                  

**************************************************************************************************

## 2020-04-25 LAB
ALBUMIN SERPL BCP-MCNC: 2.6 G/DL (ref 3.4–5)
ALP SERPL-CCNC: 62 U/L (ref 45–117)
ALT SERPL W P-5'-P-CCNC: 27 U/L (ref 12–78)
AST SERPL W P-5'-P-CCNC: 69 U/L (ref 15–37)
BUN BLD-MCNC: 25 MG/DL (ref 7–18)
GLUCOSE SERPLBLD-MCNC: 135 MG/DL (ref 74–106)
HCT VFR BLD CALC: 33.5 % (ref 39.6–49)
HCT VFR BLD CALC: 35.3 % (ref 39.6–49)
LYMPHOCYTES # SPEC AUTO: 0.6 K/UL (ref 0.7–4.9)
MAGNESIUM SERPL-MCNC: 2.2 MG/DL (ref 1.8–2.4)
PMV BLD: 8.6 FL (ref 7.6–11.3)
POTASSIUM SERPL-SCNC: 3.3 MMOL/L (ref 3.5–5.1)
RBC # BLD: 3.74 M/UL (ref 4.33–5.43)
TROPONIN I: 0.1 NG/ML (ref 0–0.04)

## 2020-04-25 RX ADMIN — ACETAMINOPHEN SCH MG: 500 TABLET, FILM COATED ORAL at 04:39

## 2020-04-25 RX ADMIN — Medication SCH ML: at 08:54

## 2020-04-25 RX ADMIN — PIPERACILLIN SODIUM AND TAZOBACTAM SODIUM SCH MLS: 3; .375 INJECTION, POWDER, LYOPHILIZED, FOR SOLUTION INTRAVENOUS at 08:54

## 2020-04-25 RX ADMIN — ACETAMINOPHEN SCH MG: 500 TABLET, FILM COATED ORAL at 16:19

## 2020-04-25 RX ADMIN — ACETAMINOPHEN SCH MG: 500 TABLET, FILM COATED ORAL at 09:15

## 2020-04-25 RX ADMIN — PIPERACILLIN SODIUM AND TAZOBACTAM SODIUM SCH: 3; .375 INJECTION, POWDER, LYOPHILIZED, FOR SOLUTION INTRAVENOUS at 01:00

## 2020-04-25 RX ADMIN — Medication SCH ML: at 21:27

## 2020-04-25 RX ADMIN — ACETAMINOPHEN SCH: 500 TABLET, FILM COATED ORAL at 21:24

## 2020-04-25 RX ADMIN — PIPERACILLIN SODIUM AND TAZOBACTAM SODIUM SCH MLS: 3; .375 INJECTION, POWDER, LYOPHILIZED, FOR SOLUTION INTRAVENOUS at 16:25

## 2020-04-25 NOTE — P.PN
Subjective


Date of Service: 04/25/20


Chief Complaint: Septic shock/hypertensive emergency/flash pulmonary edema





Physical Examination





- Vital Signs


Temperature: 97.5 F


Blood Pressure: 129/77


Pulse: 95


Respirations: 17


Pulse Ox (%): 97





- Studies


Laboratory Data (last 24 hrs)





04/24/20 15:11: WBC 16.4 H, Hgb 10.8 L, Hct 32.8 L, Plt Count 205





Microbiology Data (last 24 hrs): 








04/24/20 15:30   Nasopharnyx   Coronavirus COVID-19 PCR - Final


04/24/20 15:30   Nasopharnyx   Influenza Type A Antigen Screen - Final


04/24/20 15:30   Nasopharnyx   Influenza Type B Antigen Screen - Final








Assessment & Plan


Physician Review Additional Text: 





A/P:


Hematuria


Atrial fibrillation


Pneumonia


Renal masses





Plan: 


After speaking to wife. She WANTS NO BLOOD THINNERS. I explained the risk and 

benefits concerning this. She understands. Will HOLD ALL BLOOD THINNERS.


Will discuss with Cardiology. He has hematuria. Will further address. Will 

discuss with hospitalist


Time Spent Managing Pts Care (In Minutes): 35

## 2020-04-25 NOTE — P.PN
Subjective


Date of Service: 04/25/20





Patient more awake.  However, still having fevers of 102. Hemodynamically he is 

improved in his respiratory status is improved as well.





Review of Systems


10-point ROS is otherwise unremarkable





Physical Examination





- Vital Signs


Temperature: 102 F


Blood Pressure: 110/67


Pulse: 88


Respirations: 22


Pulse Ox (%): 96





- Physical Exam


General: Alert, In no apparent distress, Confused


Respiratory: Crackles/rales


Cardiovascular: Regular rate/rhythm, Normal S1 S2





- Studies


Laboratory Data (last 24 hrs)





04/24/20 15:11: WBC 16.4 H, Hgb 10.8 L, Hct 32.8 L, Plt Count 205


04/24/20 15:11: Sodium 132 L, Potassium 4.0, BUN 19 H, Creatinine 1.31 H, 

Glucose 119 H, Magnesium 1.6 L, Total Bilirubin 0.7, AST 31, ALT 15, Alkaline 

Phosphatase 68, Lipase 82


04/24/20 15:11: PT 14.4 H, INR 1.23, APTT 31.8





Microbiology Data (last 24 hrs): 








04/24/20 15:30   Nasopharnyx   Coronavirus COVID-19 PCR - Final


04/24/20 15:30   Nasopharnyx   Influenza Type A Antigen Screen - Final


04/24/20 15:30   Nasopharnyx   Influenza Type B Antigen Screen - Final








Assessment & Plan





- Problems (Diagnosis)


(1) Septic shock


Current Visit: Yes   Status: Acute   





(2) Elevated procalcitonin


Current Visit: Yes   Status: Acute   





(3) Hypertensive emergency


Current Visit: Yes   Status: Acute   





(4) Fever


Current Visit: Yes   Status: Acute   





(5) Flash pulmonary edema


Current Visit: Yes   Status: Acute   





(6) Renal mass


Current Visit: Yes   Status: Acute   





(7) Elevated brain natriuretic peptide (BNP) level


Current Visit: Yes   Status: Acute   





(8) Elevated troponin


Current Visit: Yes   Status: Acute   





(9) Acute kidney injury


Current Visit: Yes   Status: Acute   





(10) Leukocytosis


Current Visit: Yes   Status: Acute   





- Plan





Plan:


 continue with current plan of care as mentioned below


1. IV antibiotic therapy


2. Monitor hemodynamics closely


3. Antipyretics


4. Monitor blood pressure and oxygenation closely


5. Repeat procalcitonin level


6. Monitor white blood cell count closely


7. COVID-19 testing


8. Blood cultures x 2


9. Echocardiogram


10. GI and DVT prophylaxis





- Advance Directives


Does patient have a Living Will: No


Does patient have a Durable POA for Healthcare: No





- Code Status/Comfort Care


Code Status: Full Code





Sepsis Focused Assessment





- Focused Assessment Complete?


Sepsis Focused Assessment Completed?: Yes





- Sepsis Screen Result


Severe Sepsis: Positive





- Evaluation


Current stage of sepsis: Severe sepsis





- Vital Signs


Reviewed: Yes


Temperature: 102 F


Heart rate: 88


Blood Pressure: 110/67


Respiratory Rate: 22


O2 Sat by Pulse Oximetry: 96





- Examination


Date exam was performed: 04/25/20


Time exam was performed: 01:30


Heart: Regular rate/rhythm


Lungs: Crackles


Peripheral pulse location: Radial


Capillary refill: <2 Seconds


Skin examination: Normal turgor

## 2020-04-25 NOTE — RAD REPORT
EXAM DESCRIPTION:  US - Renal Ultrasound-Complete - 4/25/2020 9:41 pm

 

CLINICAL HISTORY:  . Renal mass/hematuria

 

COMPARISON:  April 24, 2020 CT

 

FINDINGS:  The right kidney measures 11 cm with a mildly increased echotexture.

 

The left kidney measures 11 cm with a mildly increased echotexture .

 

Small bilateral renal cysts. Large cystic mass adjacent to the superior aspect of the right kidney is
 poorly imaged on this exam. A smaller cystic mass superior to the left kidney is also poorly imaged 
on this exam.

 

Hydronephrosis is not seen.

 

Echogenic material is present within the bladder.

 

IMPRESSION:  Echogenic material within the bladder may represent a mass or hematoma.

 

Patient's known  cystic masses superior to each kidney are poorly visualized on this examination seco
ndary to difficulty with patient positioning.

 

It is recommended that the patient have a CT scan of the abdomen and pelvis with contrast to further 
evaluate the kidneys and bladder

## 2020-04-25 NOTE — RAD REPORT
EXAM DESCRIPTION:  Josee Single View4/25/2020 8:44 am

 

CLINICAL HISTORY:  Chest pain

 

COMPARISON:  April 24th

 

FINDINGS:  Partial resolution in a mild right basilar opacity

 

No other change

 

IMPRESSION:  Partial resolution in a mild right lower lobe pneumonia

## 2020-04-25 NOTE — RAD REPORT
EXAM DESCRIPTION:  USExtrem Venous W Compress Bil4/25/2020 9:41 pm

 

CLINICAL HISTORY:  Bilateral leg swelling

 

COMPARISON:  none

 

FINDINGS:  The left popliteal vein, common femoral, superficial femoral, and posterior tibial veins b
ilaterally are compressible and demonstrate augmentation. Doppler demonstrates good flow

 

Small amount of acute thrombus is present within the right popliteal vein. The vein is partially comp
ressible.

 

 

IMPRESSION:  Small amount of acute thrombus within the right popliteal vein.

## 2020-04-25 NOTE — P.CNS
Date of Consult: 04/25/20


Reason for Consult: KOURTNEY, hematuria, renal mass


Requesting Physician: Pro Lanier


Chief Complaint: Septic shock/hypertensive emergency/flash pulmonary edema


History of Present Illness: 


84 y o male pt with medical hx of HTN, HLD, tobacco use in the past admitted for

episode of sob, cough and fever. He was found to have pneumonia and elevated d 

dimers, bnp and pleural effusions. He also had elevated creatinine of 1.35 which

trended up to 1.45 today. CTA pulmonary was done to r/o PE and he was found to 

have incidental renal masses bilaterally with enlarged right kidney. he was 

started on iv antibiotics and other chronic home meds were restarted. Nephrology

was consulted for treatment recommendations in view of his elevated creatinine 

and renal mass with hematuria.


Allergies





sulfamethoxazole [From Bactrim] Allergy (Verified 04/25/20 02:36)


   Shortness of breath


trimethoprim [From Bactrim] Allergy (Verified 04/25/20 02:36)


   Shortness of breath





Home medications list reviewed: Yes


Home Medications: 








Aspirin Chewable [Aspirin Chewable*] 81 mg PO DAILY 04/25/20 


Multivitamin [Multivitamins] 1 tab PO DAILY 04/25/20 








- Past Medical/Surgical History


Diabetic: No





- Family History


  ** Father


Family History: Reviewed- Non-Contributory





- Social History


Smoking Status: Unknown if ever smoked


Alcohol use: No


CD- Drugs: No


Caffeine use: No


Place of Residence: Home





Review of Systems


10-point ROS is otherwise unremarkable





Physical Examination














Temp Pulse Resp BP Pulse Ox


 


 97.5 F   91 H  21 H  108/82   100 


 


 04/25/20 12:00  04/25/20 13:00  04/25/20 13:00  04/25/20 13:00  04/25/20 13:00








General: Alert, Oriented x3


HEENT: Atraumatic, Normocephalic


Respiratory: Clear to auscultation bilaterally


Cardiovascular: Regular rate/rhythm, Normal S1 S2


Gastrointestinal: Normal bowel sounds


Musculoskeletal: No clubbing


Neurological: Normal speech, Normal strength at 5/5 x4 extr, Cranial nerves 3-12

 intact


Laboratory Data (last 24 hrs)





04/24/20 15:11: WBC 16.4 H, Hgb 10.8 L, Hct 32.8 L, Plt Count 205


04/24/20 15:11: Sodium 132 L, Potassium 4.0, BUN 19 H, Creatinine 1.31 H, 

Glucose 119 H, Magnesium 1.6 L, Total Bilirubin 0.7, AST 31, ALT 15, Alkaline 

Phosphatase 68, Lipase 82


04/24/20 15:11: PT 14.4 H, INR 1.23, APTT 31.8





Conclusions/Impression: 


Hematuria


Renal mass


Hypertension


Pleural effusion


Pneumonia


KOURTNEY/CKD


Anemia





Plan: 


In view of his age and previous use of tobacco there is strong concern for 

renal/urologic malignancy.


His persistent hematuria is perhaps very concerning and his renal mass is also 

concerning.


We will obtain retroperitoneal US to evaluate his kidney, ureter and bladder for

 further recommendations.


We will do bladder irrigation for his hematuria to prevent acute urinary 

obstruction if his hematuria becomes heavy.


Urology consult is highly desirable at this point.


For now, we will avoid further contrast exposure and nephrotoxic antibiotics and

 drugs such as NSAIDS.


Other medical managements as per primary team.

## 2020-04-25 NOTE — CON
Date of Consultation:  04/25/2020



Patient admitted on 04/24/2020 by Dr. Lanier.  I saw the patient on 04/25/2020.



Reason For Consultation:  Atrial fibrillation.



History Of Present Illness:  Mr. Hernandez is an 84-year-old male who has a history of CVA in the past.
  He came in with sepsis, right lower lobe pneumonia and a temperature of 105, was noted to be in atr
ial fibrillation.  His troponin was slightly elevated and I was consulted for further evaluation and 
treatment.  Patient himself denied any previous cardiac history.  He apparently had a stroke 5 years 
ago, but according to him, they could not find the reason.  I am not so sure whether the atrial fibri
llation is new or chronic, but he was not taking any anticoagulants.



Past Medical History:  CVA.



Allergies:  TO BACTRIM.



Medications:  At home include aspirin.



Review of Systems:

Negative.



Social History:  Negative.



Family History:  Negative.



Physical Examination:

Vital Signs:  Stable.  He is afebrile now, but his temperature maximum was 105 when he came in.  He r
emains in atrial fibrillation at a rate of 80. 

General:  He is alert and oriented x3. 

HEENT:  Negative. 

Neck:  Supple without any bruit, lymphadenopathy, JVD, or thyromegaly. 

Chest:  Clear to auscultation and percussion on the left.  On the right, he has decreased breath soun
ds. 

Cardiac:  Revealed atrial fibrillation with an aortic sclerosis murmur.  No gallops or rubs. 

Abdomen:  Benign. 

Extremities:  Revealed no clubbing, cyanosis, or edema.



Diagnostic Data:  His troponin was 0.1.  His BNP was 15,793.  Chest x-ray showed right lower lobe pne
umonia.  Procalcitonin was 14.54, magnesium was 2.2, potassium was 4.0.  His creatinine was 1.49.  Wh
ite count was 17,000.  D-dimer is 2592.  His blood gases were 157 pO2, pCO2 of 29, and pH is 7.53.



Impression And Plan:  Atrial fibrillation probably chronic, may have been the reason he had a cerebro
vascular accident.  His rate is controlled and he is not having any symptoms from it.  But I think wi
th his age and his history of CVA, I think he definitely needs to be on anticoagulation, preferably E
liquis 5 mg b.i.d.  I will get a TSH on him.  I will get an echocardiogram on him.  Continue Lovenox 
and antibiotics for now.  His other problems include renal insufficiency, pneumonia, has had a CT of 
his head that was negative.  CT angiogram was negative despite an elevated D-dimer.  I will continue 
to follow him.





NB/LEIDY

DD:  04/25/2020 11:10:14Voice ID:  [id number]

DT:  04/25/2020 15:24:50Report ID:  [id number]

## 2020-04-25 NOTE — P.HP
Certification for Inpatient


Patient admitted to: Inpatient


With expected LOS: >2 Midnights


Patient will require the following post-hospital care: None


Practitioner: I am a practitioner with admitting privileges, knowledge of 

patient current condition, hospital course, and medical plan of care.


Services: Services provided to patient in accordance with Admission requirements

found in Title 42 Section 412.3 of the Code of Federal Regulations





Patient History


Date of Service: 04/24/20


Reason for admission: Septic shock/hypertensive emergency/flash pulmonary edema


History of Present Illness: 





Patient is an 84-year-old gentleman who came to the hospital with fever of 105. 

Patient had shortness of breath and patient blood pressure was elevated at 

200/120.  Patient was placed on BiPAP support.  Patient was placed on a nitro 

drip and had been given diuretics in the emergency room.  Patient's hemodynamics

improved in the emergency room.  Patient was sent to ICU for close monitoring.  

Patient workup revealed a right lower lobe pneumonia.  Patient also had an 

elevated procalcitonin level and acute renal insufficiency.  Patient's D-dimer 

was elevated.  Patient also had an elevated troponin and BNP level.  Patient 

will be admitted to the hospital for further evaluation.  Patient is lethargic 

and answers a lot of my questions but he really is not able to give me much 

history.  He appears to get really fatigued quickly.  He does have some 

complaints of his lower extremities as well.  They are swollen but on palpation 

he does have some pain on the left side.  This will need to be monitored 

closely.





Patient also has some incidental findings on the CT scan.  Along with a right 

lower lobe pneumonia patient also had cystic changes in bilateral kidneys.  

However there was 1 large cyst that could not be excluded for malignancy.  This 

will need to be further evaluated as an outpatient.


Allergies





sulfamethoxazole [From Bactrim] Allergy (Verified 04/25/20 02:36)


   Shortness of breath


trimethoprim [From Bactrim] Allergy (Verified 04/25/20 02:36)


   Shortness of breath





Home Medications: 








Aspirin Chewable [Aspirin Chewable*] 81 mg PO DAILY 04/25/20 


Multivitamin [Multivitamins] 1 tab PO DAILY 04/25/20 








- Past Medical/Surgical History


Past Medical History: Unable to obtain


Past Surgical History: Unable to obtain





- Family History


  ** Father


Family History: Reviewed- Non-Contributory





- Social History


Smoking Status: Never smoker


Alcohol use: No


CD- Drugs: No


Caffeine use: No


Place of Residence: Home





Review of Systems


is unable to be obtained





Physical Examination





- Vital Signs


Temperature: 98.9 F


Blood Pressure: 120/67


Pulse: 88


Respirations: 22


Pulse Ox (%): 96





- Physical Exam


General: Alert, In no apparent distress, Confused


HEENT: Atraumatic, PERRLA, Mucous membr. moist/pink, EOMI, Sclerae nonicteric


Neck: Supple, 2+ carotid pulse no bruit, No LAD, Without JVD or thyroid 

abnormality


Respiratory: Clear to auscultation bilaterally, Normal air movement


Cardiovascular: Regular rate/rhythm, Normal S1 S2, Systolic murmur


Gastrointestinal: Normal bowel sounds, Soft and benign, Non-distended, No 

tenderness


Musculoskeletal: No tenderness, Swelling


Integumentary: No rashes


Neurological: Normal speech, Normal tone, Sensation intact, Cranial nerves 3-12 

intact, Normal affect, Abnormal gait, Abnormal strength


Lymphatics: No axilla or inguinal lymphadenopathy





- Studies


Laboratory Data (last 24 hrs)





04/24/20 15:11: WBC 16.4 H, Hgb 10.8 L, Hct 32.8 L, Plt Count 205


04/24/20 15:11: Sodium 132 L, Potassium 4.0, BUN 19 H, Creatinine 1.31 H, 

Glucose 119 H, Magnesium 1.6 L, Total Bilirubin 0.7, AST 31, ALT 15, Alkaline 

Phosphatase 68, Lipase 82


04/24/20 15:11: PT 14.4 H, INR 1.23, APTT 31.8





Microbiology Data (last 24 hrs): 








04/24/20 15:30   Nasopharnyx   Coronavirus COVID-19 PCR - Final


04/24/20 15:30   Nasopharnyx   Influenza Type A Antigen Screen - Final


04/24/20 15:30   Nasopharnyx   Influenza Type B Antigen Screen - Final








Assessment & Plan





- Problems (Diagnosis)


(1) Septic shock


Current Visit: Yes   Status: Acute   





(2) Elevated procalcitonin


Current Visit: Yes   Status: Acute   





(3) Hypertensive emergency


Current Visit: Yes   Status: Acute   





(4) Fever


Current Visit: Yes   Status: Acute   





(5) Flash pulmonary edema


Current Visit: Yes   Status: Acute   





(6) Renal mass


Current Visit: Yes   Status: Acute   





(7) Elevated brain natriuretic peptide (BNP) level


Current Visit: Yes   Status: Acute   





(8) Elevated troponin


Current Visit: Yes   Status: Acute   





(9) Acute kidney injury


Current Visit: Yes   Status: Acute   





(10) Leukocytosis


Current Visit: Yes   Status: Acute   





- Plan





Plan:


1. IV antibiotic therapy


2. Monitor hemodynamics closely


3. Antipyretics


4. Monitor blood pressure and oxygenation closely


5. Repeat procalcitonin level


6. Monitor white blood cell count closely


7. COVID-19 testing


8. Blood cultures x 2


9. Echocardiogram


10. GI and DVT prophylaxis


Discharge Plan: Home


Plan to discharge in: Greater than 2 days





- Advance Directives


Does patient have a Living Will: No


Does patient have a Durable POA for Healthcare: No





- Code Status/Comfort Care


Code Status Assessed: Yes


Code Status: Full Code


Critical Care: Yes


Time Spent Managing PTS Care (In Minutes): 50

## 2020-04-26 VITALS — OXYGEN SATURATION: 95 %

## 2020-04-26 VITALS — SYSTOLIC BLOOD PRESSURE: 98 MMHG | DIASTOLIC BLOOD PRESSURE: 69 MMHG | TEMPERATURE: 97.8 F

## 2020-04-26 LAB
ALBUMIN SERPL BCP-MCNC: 2.4 G/DL (ref 3.4–5)
ALP SERPL-CCNC: 52 U/L (ref 45–117)
ALT SERPL W P-5'-P-CCNC: 35 U/L (ref 12–78)
AST SERPL W P-5'-P-CCNC: 81 U/L (ref 15–37)
BUN BLD-MCNC: 26 MG/DL (ref 7–18)
GLUCOSE SERPLBLD-MCNC: 110 MG/DL (ref 74–106)
HCT VFR BLD CALC: 33.8 % (ref 39.6–49)
LYMPHOCYTES # SPEC AUTO: 1.1 K/UL (ref 0.7–4.9)
MAGNESIUM SERPL-MCNC: 2.4 MG/DL (ref 1.8–2.4)
PMV BLD: 8.5 FL (ref 7.6–11.3)
POTASSIUM SERPL-SCNC: 3.6 MMOL/L (ref 3.5–5.1)
RBC # BLD: 3.74 M/UL (ref 4.33–5.43)
TSH SERPL DL<=0.05 MIU/L-ACNC: 4.24 UIU/ML (ref 0.36–3.74)

## 2020-04-26 RX ADMIN — Medication SCH ML: at 08:20

## 2020-04-26 RX ADMIN — PIPERACILLIN SODIUM AND TAZOBACTAM SODIUM SCH MLS: 3; .375 INJECTION, POWDER, LYOPHILIZED, FOR SOLUTION INTRAVENOUS at 01:09

## 2020-04-26 RX ADMIN — ACETAMINOPHEN SCH: 500 TABLET, FILM COATED ORAL at 04:00

## 2020-04-26 RX ADMIN — PIPERACILLIN SODIUM AND TAZOBACTAM SODIUM SCH MLS: 3; .375 INJECTION, POWDER, LYOPHILIZED, FOR SOLUTION INTRAVENOUS at 08:20

## 2020-04-26 NOTE — P.DS
Discharge Date: 04/26/20


Disposition: ROUTINE DISCHARGE


Discharge Condition: GOOD


Reason for Admission: Septic shock/hypertensive emergency/flash pulmonary edema





- Problems


(1) Septic shock


Current Visit: Yes   Status: Acute   





(2) Elevated procalcitonin


Current Visit: Yes   Status: Acute   





(3) Hypertensive emergency


Current Visit: Yes   Status: Acute   





(4) Fever


Current Visit: Yes   Status: Acute   





(5) Flash pulmonary edema


Current Visit: Yes   Status: Acute   





(6) Renal mass


Current Visit: Yes   Status: Acute   





(7) Elevated brain natriuretic peptide (BNP) level


Current Visit: Yes   Status: Acute   





(8) Elevated troponin


Current Visit: Yes   Status: Acute   





(9) Acute kidney injury


Current Visit: Yes   Status: Acute   





(10) Leukocytosis


Current Visit: Yes   Status: Acute   


Brief History of Present Illness: 





Patient is an 84-year-old gentleman who came to the hospital with fever of 105. 

Patient had shortness of breath and patient blood pressure was elevated at 

200/120.  Patient was placed on BiPAP support.  Patient was placed on a nitro 

drip and had been given diuretics in the emergency room.  Patient's hemodynamics

improved in the emergency room.  Patient was sent to ICU for close monitoring.  

Patient workup revealed a right lower lobe pneumonia.  Patient also had an 

elevated procalcitonin level and acute renal insufficiency.  Patient's D-dimer 

was elevated.  Patient also had an elevated troponin and BNP level.  Patient 

will be admitted to the hospital for further evaluation.  Patient is lethargic 

and answers a lot of my questions but he really is not able to give me much his

tory.  He appears to get really fatigued quickly.  He does have some complaints 

of his lower extremities as well.  They are swollen but on palpation he does 

have some pain on the left side.  This will need to be monitored closely.





Patient also has some incidental findings on the CT scan.  Along with a right 

lower lobe pneumonia patient also had cystic changes in bilateral kidneys.  

However there was 1 large cyst that could not be excluded for malignancy.  This 

will need to be further evaluated as an outpatient.


Vital Signs/Physical Exam: 














Temp Pulse Resp BP Pulse Ox


 


 97.8 F   101 H  15   125/81   96 


 


 04/26/20 04:00  04/26/20 04:00  04/26/20 04:00  04/26/20 04:00  04/26/20 04:00








Laboratory Data at Discharge: 














WBC  11.3 K/uL (4.3-10.9)  H D 04/26/20  08:02    


 


Hgb  11.4 g/dL (13.6-17.9)  L  04/26/20  08:02    


 


Hct  33.8 % (39.6-49.0)  L  04/26/20  08:02    


 


Plt Count  192 K/uL (152-406)   04/26/20  08:02    


 


PT  14.4 SECONDS (9.5-12.5)  H  04/24/20  15:11    


 


INR  1.23   04/24/20  15:11    


 


APTT  31.8 SECONDS (24.3-36.9)   04/24/20  15:11    


 


Sodium  137 mmol/L (136-145)   04/26/20  08:02    


 


Potassium  3.6 mmol/L (3.5-5.1)   04/26/20  08:02    


 


BUN  26 mg/dL (7-18)  H  04/26/20  08:02    


 


Creatinine  1.43 mg/dL (0.55-1.3)  H  04/26/20  08:02    


 


Glucose  110 mg/dL ()  H  04/26/20  08:02    


 


Phosphorus  2.4 mg/dL (2.5-4.9)  L  04/26/20  08:02    


 


Magnesium  2.4 mg/dL (1.8-2.4)   04/26/20  08:02    


 


Total Bilirubin  0.3 mg/dL (0.2-1.0)   04/26/20  08:02    


 


AST  81 U/L (15-37)  H  04/26/20  08:02    


 


ALT  35 U/L (12-78)   04/26/20  08:02    


 


Alkaline Phosphatase  52 U/L ()   04/26/20  08:02    


 


Troponin I  0.10 ng/mL (0.0-0.045)  H  04/25/20  05:18    


 


Lipase  82 U/L ()   04/24/20  15:11    








Home Medications: 








Aspirin Chewable [Aspirin Chewable*] 81 mg PO DAILY 04/25/20 


Multivitamin [Multivitamins] 1 tab PO DAILY 04/25/20 





Patient Discharge Instructions: OK TO DC IV AND DC HOME.  FOLLOW-UP WITH PRIMARY

 CARE PROVIDER IN 1-2 WEEKS.  FOLLOW-UP WITH CARDIOLOGY IN 1-2 WEEKS.  RETURN TO

 THE ER IF.  CALL or TEXT DR. ALVARADO [phone] IF ANY QUESTIONS REGARDING 

HOSPITAL STAY.  PLEASE CALL THE FLOOR [phone] IF ANY MEDICATION OR NURSING 

QUESTIONS.


Diet: AHA


Activity: Fall precautions

## 2020-04-26 NOTE — P.PN
Date of Service: 04/26/20





Had a long discussion with patient's wife.  She does not want him on any blood 

thinners at this time.  I told her of the risk of a pulmonary embolism occurring

and she says she will speak with him but they are wanting to come home and 

arrange all the follow-ups as an outpatient.  I did notify her that with his 

hematuria he needs to be seen as soon as possible.  She spoke to her daughter 

in-law and they will take patient to their urologist.  I informed them that he 

needs to return to the emergency room if he continues to have hematuria that is 

progressing.  I did try to impart to her the emergent nature of his treatment.  

However, she is worried that he could die from all of this, but she wants to 

take him either to [facility] or to her son's vascular surgeon & urologist. 

She states that if he does worsen over the next couple of days, she will bring 

him in to the emergency room were he can get transferred.  They been  for

63 years and she wants to make this decision together with him so unfortunately,

she is going to bring him home and then decide what to do.

## 2020-04-26 NOTE — PN
Date of Progress Note:  04/26/2020



Subjective:  Mr. Hernandez has a history of congestive heart failure, atrial fibrillation, CVA, was adm
itted with sepsis, elevated troponin, right lower lobe pneumonia.  His consultation initially was mos
tly for his atrial fibrillation, which appears to be chronic.  Patient has had a CVA in the past.  He
 was only on aspirin when he came into the hospital.  He has an echocardiogram pending for the erika gilbert.  He remains in atrial fibrillation, rate controlled.  No symptoms.  TSH is still pending.  I still
 believe that Mr. Hernandez should be on an anticoagulant other than aspirin, but apparently there is s
ome resistance as far as the family is concerned.  Dr. Lanier discussing that with them.  We will con
tinue present treatment for now.





NB/LEIDY

DD:  04/26/2020 08:08:03Voice ID:  [id number]

DT:  04/26/2020 11:11:37Report ID:  [id number]

## 2020-04-27 ENCOUNTER — HOSPITAL ENCOUNTER (EMERGENCY)
Dept: HOSPITAL 97 - ER | Age: 85
Discharge: ANOTHER HOSPITAL | End: 2020-04-27
Payer: COMMERCIAL

## 2020-04-27 VITALS — DIASTOLIC BLOOD PRESSURE: 90 MMHG | OXYGEN SATURATION: 95 % | SYSTOLIC BLOOD PRESSURE: 149 MMHG

## 2020-04-27 VITALS — TEMPERATURE: 97.9 F

## 2020-04-27 DIAGNOSIS — Z88.2: ICD-10-CM

## 2020-04-27 DIAGNOSIS — I82.401: ICD-10-CM

## 2020-04-27 DIAGNOSIS — R33.9: Primary | ICD-10-CM

## 2020-04-27 DIAGNOSIS — N17.9: ICD-10-CM

## 2020-04-27 DIAGNOSIS — Z79.82: ICD-10-CM

## 2020-04-27 LAB
BUN BLD-MCNC: 35 MG/DL (ref 7–18)
GLUCOSE SERPLBLD-MCNC: 112 MG/DL (ref 74–106)
HCT VFR BLD CALC: 32.3 % (ref 39.6–49)
INR BLD: 1.04
LYMPHOCYTES # SPEC AUTO: 1.1 K/UL (ref 0.7–4.9)
PMV BLD: 8.4 FL (ref 7.6–11.3)
POTASSIUM SERPL-SCNC: 3.4 MMOL/L (ref 3.5–5.1)
RBC # BLD: 3.62 M/UL (ref 4.33–5.43)
UA COMPLETE W REFLEX CULTURE PNL UR: (no result)
UA DIPSTICK W REFLEX MICRO PNL UR: (no result)

## 2020-04-27 PROCEDURE — 51702 INSERT TEMP BLADDER CATH: CPT

## 2020-04-27 PROCEDURE — 85610 PROTHROMBIN TIME: CPT

## 2020-04-27 PROCEDURE — 87088 URINE BACTERIA CULTURE: CPT

## 2020-04-27 PROCEDURE — 51700 IRRIGATION OF BLADDER: CPT

## 2020-04-27 PROCEDURE — 36415 COLL VENOUS BLD VENIPUNCTURE: CPT

## 2020-04-27 PROCEDURE — 99285 EMERGENCY DEPT VISIT HI MDM: CPT

## 2020-04-27 PROCEDURE — 85025 COMPLETE CBC W/AUTO DIFF WBC: CPT

## 2020-04-27 PROCEDURE — 81003 URINALYSIS AUTO W/O SCOPE: CPT

## 2020-04-27 PROCEDURE — 87086 URINE CULTURE/COLONY COUNT: CPT

## 2020-04-27 PROCEDURE — 85730 THROMBOPLASTIN TIME PARTIAL: CPT

## 2020-04-27 PROCEDURE — 80048 BASIC METABOLIC PNL TOTAL CA: CPT

## 2020-04-27 PROCEDURE — 81015 MICROSCOPIC EXAM OF URINE: CPT

## 2020-04-27 NOTE — ER
Nurse's Notes                                                                                     

 [facility] [place]                                                                 

Name: Brett Hernandez                                                                                 

Age: 84 yrs                                                                                       

Sex: Male                                                                                         

: 1935                                                                                   

MRN: [id number]                                                                                   

Arrival Date: 2020                                                                          

Time: 09:36                                                                                       

Account#: [id number]                                                                            

Bed 15                                                                                            

Private MD:                                                                                       

Diagnosis: Hematuria, unspecified;Urinary Retention;Acute kidney failure;Acute embolism and       

  thrombosis of unspecified deep veins of right distal lower extremity                            

                                                                                                  

Presentation:                                                                                     

                                                                                             

09:39 Chief complaint: EMS states: pt has had difficulty urinating since yesterday and today  iw  

      appeared to have blood in his urine. Coronavirus screen: Proceed with normal triage.        

      Patient denies a cough. Patient denies shortness of breath or difficulty breathing.         

      Patient denies measured and/or subjective temperature greater than 100.4F prior to          

      today's visit. Patient denies travel on a cruise ship or to a country the [facility] currently     

      lists as an affected area. Patient denies contact with known and/or suspected case of       

      COVID-19. Ebola Screen: Patient negative for fever greater than or equal to 101.5           

      degrees Fahrenheit, and additional compatible Ebola Virus Disease symptoms Patient          

      denies exposure to infectious person. Patient denies travel to an Ebola-affected area       

      in the 21 days before illness onset. No symptoms or risks identified at this time.          

09:39 Method Of Arrival: EMS: [facility]                                                iw  

09:43 Initial Sepsis Screen: Does the patient meet any 2 criteria? No. Patient's initial      iw  

      sepsis screen is negative. Does the patient have a suspected source of infection? No.       

      Patient's initial sepsis screen is negative. Risk Assessment: Do you want to hurt           

      yourself or someone else? Patient reports no desire to harm self or others. Onset of        

      symptoms was 2020.                                                                

09:43 Acuity: VITO 3                                                                           iw  

10:10 Care prior to arrival: IV initiated. 18 GA, in the left antecubital area.               iw  

                                                                                                  

Historical:                                                                                       

- Allergies:                                                                                      

09:43 Sulfa (Sulfonamide Antibiotics);                                                        iw  

- Home Meds:                                                                                      

09:43 aspirin 81 mg Oral chew 1 tab once daily [Active]; multivitamin Oral daily [Active];    iw  

- PMHx:                                                                                           

09:43 CVA;                                                                                    iw  

- PSHx:                                                                                           

09:43 Hernia repair;                                                                          iw  

                                                                                                  

- Immunization history:: Adult Immunizations.                                                     

- Family history:: not pertinent.                                                                 

- Social history:: Smoking status: Patient denies any tobacco usage or history of.                

- Hospitalizations: : The patient was recently seen at [facility].                                                                                         

                                                                                                  

                                                                                                  

Screening:                                                                                        

10:10 Abuse screen: Denies threats or abuse. Denies injuries from another. Nutritional        iw  

      screening: No deficits noted. Tuberculosis screening: No symptoms or risk factors           

      identified. Fall Risk Secondary diagnosis (15 points) impaired mobility, CVA, IV access     

      (20 points).                                                                                

                                                                                                  

Assessment:                                                                                       

10:08 General: Appears in no apparent distress. Behavior is calm, cooperative. Pain:          iw  

      Complains of pain in right calf and right shin. Neuro: Level of Consciousness is awake,     

      alert, obeys commands, Oriented to person, place, time, Weakness in right arm(s)            

      leg(s). Cardiovascular: Denies chest pain, Patient's skin is warm and dry. Edema.           

      Respiratory: Airway is patent Respiratory effort is even, unlabored, Respiratory            

      pattern is regular. GI: Abdomen is flat. : Urine is blood tinged, Genitalia appear        

      normal Last void was 2020. Derm: Skin is fragile, is thin, Skin is normal.        

      Musculoskeletal: Range of motion: limited in right elbow and right wrist.                   

10:38 Reassessment: Sam catheter inserted, gross hematuria noted, no clots noted, urine     iw  

      flowing freely, approx 250 mL output, Dr. Castillo notified, order for bladder irrigation.     

11:33 Reassessment: Patient appears in no apparent distress at this time. No changes from     ca1 

      previously documented assessment. Patient and/or family updated on plan of care and         

      expected duration. Pain level reassessed. Patient is alert, oriented x 3, equal             

      unlabored respirations, skin warm/dry/pink.                                                 

12:26 Reassessment: Patient appears in no apparent distress at this time. Patient and/or      ca1 

      family updated on plan of care and expected duration. Pain level reassessed. Patient is     

      alert, oriented x 3, equal unlabored respirations, skin warm/dry/pink.                      

12:33 Reassessment: Report given to ARPIT Thomas at [facility].                               ca1 

13:11 Reassessment: Patient appears in no apparent distress at this time. Patient is alert,   ca1 

      oriented x 3, equal unlabored respirations, skin warm/dry/pink.                             

                                                                                                  

Vital Signs:                                                                                      

09:39  / 97; Pulse 105; Resp 16 S; Temp 97.9; Pulse Ox 96% on R/A;                      iw  

11:33  / 79; Pulse 100; Resp 20; Pulse Ox 96% on R/A;                                   ca1 

12:19  / 87; Pulse 106; Resp 16 S; Pulse Ox 98% on R/A;                                 ca1 

13:11  / 90; Pulse 100; Resp 17 S; Pulse Ox 95% on R/A;                                 ca1 

                                                                                                  

ED Course:                                                                                        

09:36 Patient arrived in ED.                                                                  iw  

09:38 Daron Castillo MD is Attending Physician.                                                rn  

09:39 Mae Andersen RN is Primary Nurse.                                                   iw  

09:43 Arm band placed on.                                                                     iw  

09:44 Triage completed.                                                                       iw  

10:00 Patient has correct armband on for positive identification. Placed in gown. Bed in low  ca1 

      position. Call light in reach. Side rails up X2. Cardiac monitor on. Pulse ox on. NIBP      

      on.                                                                                         

10:00 Warm blanket given.                                                                     ca1 

10:10 Maintain EMS IV. Dressing intact. Good blood return noted. Site clean \T\ dry. Gauge \T\    iw

      site: 18 LAC.                                                                               

11:00 Sam cath inserted, using sterile technique, 16 Fr., by ED staff, balloon inflated, to ca1 

      gravity drainage, urine specimen collected. returned bloody urine. Patient tolerated        

      well.                                                                                       

11:16 Bladder irrigated via Sam with 250 ml normal saline returned with clots. Irrigated    ca1 

      til clear Patient tolerated well.                                                           

12:24 No provider procedures requiring assistance completed. Patient transferred, IV remains  ca1 

      in place.                                                                                   

12:38 pt accepted in transfer to [place] [address] room 965.                    bd  

                                                                                                  

Administered Medications:                                                                         

No medications were administered                                                                  

                                                                                                  

                                                                                                  

Outcome:                                                                                          

12:08 ER care complete, transfer ordered by MD.                                               rn  

13:13 Transferred by ground EMS to [facility], Transfer form          ca1 

      completed.                                                                                  

13:13 Condition: stable                                                                           

13:13 Instructed on the need for transfer.                                                        

13:13 Patient left the ED.                                                                    ca1 

                                                                                                  

Signatures:                                                                                       

Bridget Barry Irene, ARPIT                     RN                                                      

Daron Castillo MD MD rn Acob, ARPIT Hayes                        RN   ca1                                                  

                                                                                                  

Corrections: (The following items were deleted from the chart)                                    

11:35 11:33  / 79; Pulse 111bpm; Resp 20bpm; Pulse Ox 96% RA; ca1                       ca1 

                                                                                                  

**************************************************************************************************

## 2020-04-27 NOTE — EKG
Test Date:    2020-04-24               Test Time:    14:57:21

Technician:   ZENOBIA                                    

                                                     

MEASUREMENT RESULTS:                                       

Intervals:                                           

Rate:         110                                    

MI:                                                  

QRSD:         92                                     

QT:           336                                    

QTc:          454                                    

Axis:                                                

P:                                                   

MI:                                                  

QRS:          -57                                    

T:            71                                     

                                                     

INTERPRETIVE STATEMENTS:                                       

                                                     

Atrial fibrillation with rapid ventricular response with premature

ventricular or aberrantly conducted 

complexes

Pulmonary disease pattern

Left anterior fascicular block

Nonspecific ST and T wave abnormality, probably digitalis effect

Abnormal ECG

Compared to ECG 08/03/2016 15:56:43

Ventricular premature complex(es) now present

Left anterior fascicular block now present

ST (T wave) deviation now present

Sinus rhythm no longer present

Left-axis deviation no longer present



Electronically Signed On 04-27-20 20:11:37 CDT by Horacio Brewer

## 2020-04-27 NOTE — EDPHYS
Physician Documentation                                                                           

 [facility]                                                                 

Name: Brett Hernandez                                                                                 

Age: 84 yrs                                                                                       

Sex: Male                                                                                         

: 1935                                                                                   

MRN: [id number]                                                                                   

Arrival Date: 2020                                                                          

Time: 09:36                                                                                       

Account#: [id number]                                                                            

Bed 15                                                                                            

Private MD:                                                                                       

ED Physician Daron Castillo                                                                         

HPI:                                                                                              

                                                                                             

09:52 This 84 yrs old  Male presents to ER via EMS with complaints of Urinary        rn  

      Problem.                                                                                    

09:52 The patient presents with urinary symptoms, hematuria. Onset: The symptoms/episode      rn  

      began/occurred last night. Modifying factors: The symptoms are alleviated by nothing,       

      the symptoms are aggravated by urinating. Associated signs and symptoms: The patient        

      has no apparent associated signs or symptoms. Severity of symptoms: At their worst the      

      symptoms were moderate, in the emergency department the symptoms have improved. The         

      patient has experienced a previous episode. The patient has been recently been admitted     

      at [facility]. Patient reports recently admitted to hospital,        

      back today for blood in urine, had difficulty urinating last night, does not recall if      

      urinated today. no fever. No trauma. Dr Dunham called prior to arrival, informed me that      

      patient recently admitted, found to have renal cyst/mass, as well as a thrombosis,          

      after discussion with patient and wife, they had decided against anticoagulation and        

      discharged recently. .                                                                      

                                                                                                  

Historical:                                                                                       

- Allergies:                                                                                      

09:43 Sulfa (Sulfonamide Antibiotics);                                                        iw  

- Home Meds:                                                                                      

09:43 aspirin 81 mg Oral chew 1 tab once daily [Active]; multivitamin Oral daily [Active];    iw  

- PMHx:                                                                                           

09:43 CVA;                                                                                    iw  

- PSHx:                                                                                           

09:43 Hernia repair;                                                                          iw  

                                                                                                  

- Immunization history:: Adult Immunizations.                                                     

- Family history:: not pertinent.                                                                 

- Social history:: Smoking status: Patient denies any tobacco usage or history of.                

- Hospitalizations: : The patient was recently seen at [facility].                                                                                         

                                                                                                  

                                                                                                  

ROS:                                                                                              

09:52 Constitutional: Negative for fever, chills, and weight loss, Eyes: Negative for injury, rn  

      pain, redness, and discharge, Neck: Negative for injury, pain, and swelling,                

      Cardiovascular: Negative for chest pain, palpitations, and edema, Respiratory: Negative     

      for shortness of breath, cough, wheezing, and pleuritic chest pain, Abdomen/GI:             

      Negative for abdominal pain, nausea, vomiting, diarrhea, and constipation, :              

      hematuria and dysuria MS/Extremity: + right leg "spasms" Skin: Negative for injury,         

      rash, and discoloration, Neuro: Negative for headache, numbness, tingling, and seizure.     

                                                                                                  

Exam:                                                                                             

09:52 Constitutional:  Thin male, no acute distress Head/Face:  Normocephalic, atraumatic.    rn  

      ENT:  dry MM Cardiovascular:  Tachycardic, regular Respiratory:  No increased work of       

      breathing, no retractions or nasal flaring. Abdomen/GI:  soft, non-tender Skin:  Warm,      

      dry, + bilateral lower ext erythema and chronic skin changes MS/ Extremity:  Pulses         

      equal, no cyanosis.  Neurovascular intact.  Full, normal range of motion.  Equal            

      circumference. Neuro:  Awake and alert.  Answers all questions.                             

                                                                                                  

Vital Signs:                                                                                      

09:39  / 97; Pulse 105; Resp 16 S; Temp 97.9; Pulse Ox 96% on R/A;                      iw  

11:33  / 79; Pulse 100; Resp 20; Pulse Ox 96% on R/A;                                   ca1 

12:19  / 87; Pulse 106; Resp 16 S; Pulse Ox 98% on R/A;                                 ca1 

13:11  / 90; Pulse 100; Resp 17 S; Pulse Ox 95% on R/A;                                 ca1 

                                                                                                  

MDM:                                                                                              

09:38 Patient medically screened.                                                             rn  

12:03 Differential diagnosis: UTI, urinary retention, renal cancer, urinary retention.        rn  

12:05 Data reviewed: vital signs, nurses notes, lab test result(s), radiologic studies, CT    rn  

      scan, and as a result, I will admit patient. Counseling: I had a detailed discussion        

      with the patient and/or guardian regarding: the historical points, exam findings, and       

      any diagnostic results supporting the discharge/admit diagnosis, lab results, radiology     

      results, the need to transfer to another facility, for higher level of care, [facility] does not immediately have the required specialist. Response to            

      treatment: the patient's symptoms have mildly improved after treatment. Admission           

      orders: after a detailed discussion of the patient's condition and case, the admit          

      orders are written by me. ED course: Pt with still gross hematuria, worsening renal         

      function, known renal mass, could be cancer, accepted for transfer given no urology         

      here and worsening symptoms/complications. .                                                

12:08 ED course: Anticoagulation held given known DVT from hospitalization and still gross    rn  

      hematuria..                                                                                 

                                                                                                  

                                                                                             

09:40 Order name: CBC with Diff; Complete Time: 11:17                                         rn  

                                                                                             

09:40 Order name: Basic Metabolic Panel; Complete Time: 11:17                                 rn  

                                                                                             

09:40 Order name: Urine Culture                                                               rn  

                                                                                             

09:40 Order name: PT-INR; Complete Time: 11:17                                                rn  

                                                                                             

09:40 Order name: Ptt, Activated; Complete Time: 11:17                                        rn  

                                                                                             

09:40 Order name: IV Start; Complete Time: 09:54                                              rn  

                                                                                             

09:40 Order name: Urine Dipstick-Ancillary (obtain specimen); Complete Time: 10:36            rn  

                                                                                             

09:40 Order name: Bladder Scanner: post void; Complete Time: 10:04                            rn  

                                                                                             

10:34 Order name: UA; Complete Time: 11:17                                                    bd  

                                                                                             

10:59 Order name: Urine Microscopic Only; Complete Time: 11:17                                EDMS

                                                                                             

11:20 Order name: Sam; Complete Time: 11:32                                                 rn  

                                                                                                  

Administered Medications:                                                                         

No medications were administered                                                                  

                                                                                                  

                                                                                                  

Disposition:                                                                                      

20 12:08 Transfer ordered to [facility]. Diagnosis are Hematuria, unspecified, Urinary     

  Retention, Acute kidney failure, Acute embolism and thrombosis of unspecified                   

  deep veins of right distal lower extremity.                                                     

- Reason for transfer: Higher level of care.                                                      

- Accepting physician is .                                                                     

- Condition is Stable.                                                                            

- Problem is an ongoing problem.                                                                  

- Symptoms have worsened.                                                                         

                                                                                                  

                                                                                                  

                                                                                                  

Signatures:                                                                                       

Dispatcher [employer]                           [facility]                                                 

Mae Andersen RN RN iw Nieto, Roman, MD MD rn                                                   

AcobAbigail RN RN   ca1                                                  

                                                                                                  

Corrections: (The following items were deleted from the chart)                                    

11:03 09:41 UA MICROSCOPIC+U.LAB.BRZ ordered. [facility]

11:43 09:52 Constitutional: Thin male, no acute distress Head/Face: Normocephalic,            rn  

      atraumatic. ENT: dry MM Cardiovascular: Tachycardic, regular Respiratory: No increased      

      work of breathing, no retractions or nasal flaring. Abdomen/GI: soft, non-tender Skin:      

      Warm, dry MS/ Extremity: Pulses equal, no cyanosis. Neurovascular intact. Full, normal      

      range of motion. Equal circumference. Neuro: Awake and alert. Answers all questions. rn     

13:13 12:08 2020 12:08 Transfer ordered to [facility]. Diagnosis is Hematuria,         ca1 

      unspecified; Urinary Retention; Acute kidney failure; Acute embolism and thrombosis of      

      unspecified deep veins of right distal lower extremity. Reason for transfer: Higher         

      level of care. Accepting physician is . Condition is Stable. Problem is an ongoing       

      problem. Symptoms have worsened. rn                                                         

                                                                                                  

**************************************************************************************************

## 2020-04-27 NOTE — EKG
Test Date:    2020-04-24               Test Time:    15:00:15

Technician:   ZENOBIA                                    

                                                     

MEASUREMENT RESULTS:                                       

Intervals:                                           

Rate:         115                                    

MN:                                                  

QRSD:         88                                     

QT:           326                                    

QTc:          450                                    

Axis:                                                

P:                                                   

MN:                                                  

QRS:          -45                                    

T:            38                                     

                                                     

INTERPRETIVE STATEMENTS:                                       

                                                     

Atrial fibrillation with rapid ventricular response

Left axis deviation

Low voltage QRS

Abnormal ECG

Compared to ECG 04/24/2020 14:57:21

Left-axis deviation now present

Low QRS voltage now present

Ventricular premature complex(es) no longer present

Left anterior fascicular block no longer present

ST (T wave) deviation no longer present



Electronically Signed On 04-27-20 20:11:36 CDT by Horacio Brewer

## 2020-05-09 ENCOUNTER — HOSPITAL ENCOUNTER (EMERGENCY)
Dept: HOSPITAL 97 - ER | Age: 85
Discharge: HOME | End: 2020-05-09
Payer: COMMERCIAL

## 2020-05-09 VITALS — DIASTOLIC BLOOD PRESSURE: 89 MMHG | OXYGEN SATURATION: 99 % | SYSTOLIC BLOOD PRESSURE: 145 MMHG

## 2020-05-09 VITALS — TEMPERATURE: 98.8 F

## 2020-05-09 DIAGNOSIS — Z20.828: ICD-10-CM

## 2020-05-09 DIAGNOSIS — J90: Primary | ICD-10-CM

## 2020-05-09 DIAGNOSIS — Z88.2: ICD-10-CM

## 2020-05-09 DIAGNOSIS — Z85.528: ICD-10-CM

## 2020-05-09 DIAGNOSIS — Z86.73: ICD-10-CM

## 2020-05-09 DIAGNOSIS — I48.20: ICD-10-CM

## 2020-05-09 LAB
ALBUMIN SERPL BCP-MCNC: 2.3 G/DL (ref 3.4–5)
ALP SERPL-CCNC: 62 U/L (ref 45–117)
ALT SERPL W P-5'-P-CCNC: 19 U/L (ref 12–78)
AST SERPL W P-5'-P-CCNC: 17 U/L (ref 15–37)
BUN BLD-MCNC: 26 MG/DL (ref 7–18)
GLUCOSE SERPLBLD-MCNC: 98 MG/DL (ref 74–106)
HCT VFR BLD CALC: 32.6 % (ref 39.6–49)
INR BLD: 1.1
LYMPHOCYTES # SPEC AUTO: 1.3 K/UL (ref 0.7–4.9)
MAGNESIUM SERPL-MCNC: 2 MG/DL (ref 1.8–2.4)
NT-PROBNP SERPL-MCNC: 2546 PG/ML (ref ?–450)
PMV BLD: 7.9 FL (ref 7.6–11.3)
POTASSIUM SERPL-SCNC: 3.5 MMOL/L (ref 3.5–5.1)
RBC # BLD: 3.61 M/UL (ref 4.33–5.43)
TROPONIN (EMERG DEPT USE ONLY): < 0.02 NG/ML (ref 0–0.04)

## 2020-05-09 PROCEDURE — 80076 HEPATIC FUNCTION PANEL: CPT

## 2020-05-09 PROCEDURE — 99284 EMERGENCY DEPT VISIT MOD MDM: CPT

## 2020-05-09 PROCEDURE — 85025 COMPLETE CBC W/AUTO DIFF WBC: CPT

## 2020-05-09 PROCEDURE — 80048 BASIC METABOLIC PNL TOTAL CA: CPT

## 2020-05-09 PROCEDURE — 83735 ASSAY OF MAGNESIUM: CPT

## 2020-05-09 PROCEDURE — 93005 ELECTROCARDIOGRAM TRACING: CPT

## 2020-05-09 PROCEDURE — 85610 PROTHROMBIN TIME: CPT

## 2020-05-09 PROCEDURE — 96360 HYDRATION IV INFUSION INIT: CPT

## 2020-05-09 PROCEDURE — 84484 ASSAY OF TROPONIN QUANT: CPT

## 2020-05-09 PROCEDURE — 83880 ASSAY OF NATRIURETIC PEPTIDE: CPT

## 2020-05-09 PROCEDURE — 36415 COLL VENOUS BLD VENIPUNCTURE: CPT

## 2020-05-09 NOTE — XMS REPORT
Summary of Care

                             Created on:May 5, 2020



Patient:Vance Hernandez

Sex:Male

:1935

External Reference #:[id number]





Demographics







                          Address                   [address] [place] [address]

 

                          Mobile Phone              [phone]

 

                          Home Phone                [phone]

 

                          Phone                     [phone]

 

                          Email Address             [email or web address]

 

                          Preferred Language        English

 

                          Marital Status            

 

                          Spiritism Affiliation     Unknown

 

                          Race                      White

 

                          Ethnic Group              Not  or 









Author







                          Organization              [facility]

 

                          Address                   [address] [place] [id number]









Support







                Name            Relationship    Address         Phone

 

                Kassandra Hernandez Unavailable     [address]    [phone]



                                                [place] [address] 









Care Team Providers







                    Name                Role                Phone

 

                    Karel  Leah         Primary Care Provider [phone]









Reason for Visit







                          Reason                    Comments

 

                          Transition Of Care        







Encounter Details







             Date         Type         Department   Care Team    Description

 

             2020   Transition of Care [facility] [place]

 Transition Of 

Care



                                                [facility]



                                        



                                                    [phone] 







Allergies







             Active Allergy Reactions    Severity     Noted Date   Comments

 

             Sulfa (Sulfonamide Antibiotics) Shortness of Breath                 



documented as of this encounter (statuses as of 2020)



Medications







          Medication Sig       Dispensed Refills   Start Date End Date  Status

 

          aspirin 81 mg chewable Take 81 mg by           0                      

       Active



          tablet    mouth daily.                                         

 

          multivitamin tablet Take 1 tablet by           0                      

       Active



                    mouth daily.                                         

 

          tamsulosin 0.4 mg 24 Take 1 capsule by 30 capsule 5         2020

           Active



          hr capsuleIndications: mouth daily.                                   

      



          Right renal mass,                                                   



          Gross hematuria                                                   

 

          finasteride 5 mg Take 1 tablet by 90 tablet 3         2020      

     Active



          tabletIndications: mouth daily.                                       

  



          Hematuria, unspecified                                                

   



          type                                                        



documented as of this encounter (statuses as of 2020)



Active Problems







                          Problem                   Noted Date

 

                          Hematuria                 2020

 

                          Acute, but ill-defined, cerebrovascular disease 2015



documented as of this encounter (statuses as of 2020)



Social History







             Tobacco Use  Types        Packs/Day    Years Used   Date

 

             Never Assessed                                        









                          Sex Assigned at Birth     Date Recorded

 

                          Not on file               









                    Job Start Date      Occupation          Industry

 

                    Not on file         Not on file         Not on file









                    Travel History      Travel Start        Travel End









                                        No recent travel history available.









                    COVID-19 Exposure   Response            Date Recorded

 

                    In the last month, have you been in contact with No / Unsure

         2020  1:22 PM 

CDT



                    someone who was confirmed or suspected to have              

       



                    Coronavirus / COVID-19?                     



documented as of this encounter



Last Filed Vital Signs

Not on filedocumented in this encounter



Plan of Treatment







                Health Maintenance Due Date        Last Done       Comments

 

                DTaP,Tdap,and Td Vaccines (1 - Tdap) 10/19/1946                 

     

 

                Zoster Recombinant Vaccine (SHINGRIX) (1 of 2) 10/19/1986       

               

 

                Medicare Wellness Visit 10/19/2001                      

 

                PNEUMOCOCCAL VACCINES 65+ (1 of 2 - PCV13) 10/19/2001           

           

 

                INFLUENZA VACCINE (Season Ended) 2020                     

 



documented as of this encounter



Results

Not on filedocumented in this encounter



Insurance







          Payer     Benefit Plan / Subscriber ID Effective Dates Phone     Addre

ss   Type



                    Group                                             

 

          MEDICARE  MEDICARE PART xxxxxxxxxxx 10/1/2001-Omar [phone] [address] 

Medicare



                      [address]                 [address]         [address]          [id number]



                                        



                                                            [place] 



                                                            [id number] 



documented as of this encounter

## 2020-05-09 NOTE — EDPHYS
Physician Documentation                                                                           

 [facility]                                                                 

Name: Brett Hernandez                                                                                 

Age: 84 yrs                                                                                       

Sex: Male                                                                                         

: 1935                                                                                   

MRN: [id number]                                                                                   

Arrival Date: 2020                                                                          

Time: 16:29                                                                                       

Account#: [id number]                                                                            

Bed 30                                                                                            

Private MD:                                                                                       

ED Physician Sujit Tineo                                                                      

HPI:                                                                                              

                                                                                             

17:32 This 84 yrs old  Male presents to ER via EMS with complaints of Abnormal Lab   snw 

      Results.                                                                                    

17:32 Onset: The symptoms/episode began/occurred gradually. Associated signs and symptoms:    snw 

      Pertinent positives: cough. It is unknown whether or not the patient has had similar        

      symptoms in the past. The patient has been recently seen by a physician: with similar       

      presenting complaints, pt was sent to [place] post last hospitalization.                  

                                                                                                  

Historical:                                                                                       

- Allergies:                                                                                      

16:37 Sulfa (Sulfonamide Antibiotics);                                                        ss  

- PMHx:                                                                                           

16:37 CVA; Neoplasm of R kidney; cardiomegaly;                                                ss  

- PSHx:                                                                                           

16:37 Hernia repair;                                                                          ss  

                                                                                                  

- Immunization history:: Adult Immunizations up to date.                                          

                                                                                                  

                                                                                                  

ROS:                                                                                              

17:32 Constitutional: Negative for fever, chills, and weight loss, Eyes: Negative for injury, snw 

      pain, redness, and discharge, ENT: Negative for injury, pain, and discharge, Neck:          

      Negative for injury, pain, and swelling, Cardiovascular: Negative for chest pain,           

      palpitations, and edema, Abdomen/GI: Negative for abdominal pain, nausea, vomiting,         

      diarrhea, and constipation, Back: Negative for injury and pain, : Negative for            

      injury, bleeding, discharge, and swelling, MS/Extremity: Negative for injury and            

      deformity, Skin: Negative for injury, rash, and discoloration, Neuro: Negative for          

      headache, weakness, numbness, tingling, and seizure, Psych: Negative for depression,        

      anxiety, suicide ideation, homicidal ideation, and hallucinations.                          

17:32 Respiratory: Positive for cough, with no reported sputum.                                   

                                                                                                  

Exam:                                                                                             

17:29 Head/Face:  Normocephalic, atraumatic. Eyes:  Pupils equal round and reactive to light, snw 

      extra-ocular motions intact.  Lids and lashes normal.  Conjunctiva and sclera are           

      non-icteric and not injected.  Cornea within normal limits.  Periorbital areas with no      

      swelling, redness, or edema.                                                                

17:29 Neck:  Trachea midline, no thyromegaly or masses palpated, and no cervical                  

      lymphadenopathy.  Supple, full range of motion without nuchal rigidity, or vertebral        

      point tenderness.  No Meningismus. Chest/axilla:  Normal chest wall appearance and          

      motion.  Nontender with no deformity.  No lesions are appreciated.                          

17:29 Abdomen/GI:  Soft, non-tender, with normal bowel sounds.  No distension or tympany.  No     

      guarding or rebound.  No evidence of tenderness throughout. Back:  No spinal                

      tenderness.  No costovertebral tenderness.  Full range of motion. MS/ Extremity:            

      Pulses equal, no cyanosis.  Neurovascular intact.  Full, normal range of motion. Neuro:     

       Awake and alert, GCS 15, oriented to person, place, time, and situation.  Cranial          

      nerves II-XII grossly intact.  Motor strength 5/5 in all extremities.  Sensory grossly      

      intact.  Cerebellar exam normal.  Normal gait. Psych:  Awake, alert, with orientation       

      to person, place and time.  Behavior, mood, and affect are within normal limits.            

17:29 Constitutional: The patient appears awake, listless.                                        

17:29 ENT: Mouth: Oral mucosa: dry, Voice: is normal.                                             

17:29 Cardiovascular: Rate: normal, Rhythm: irregularly irregular, Heart sounds: normal.          

17:29 ECG was reviewed by the Attending Physician.                                                

17:29 Respiratory: the patient does not display signs of respiratory distress,  Respirations:     

      shallow respirations, Breath sounds: diminished bases, denies cough but coughing a lot      

      in exam room.                                                                               

17:29 Skin: Turgor: is poor.                                                                      

                                                                                                  

Vital Signs:                                                                                      

16:32  / 96; Pulse 107; Resp 18; Temp 98.4(TE); Pulse Ox 98% on R/A;                    ss  

17:58  / 105; Pulse 94; Resp 18; Temp 98.8(O); Pulse Ox 96% on R/A; Pain 0/10;          em  

18:45  / 89; Pulse 59; Resp 18; Pulse Ox 99% on R/A;                                    em  

                                                                                                  

MDM:                                                                                              

17:25 Patient medically screened.                                                             yasmin 

18:12 Data reviewed: vital signs, nurses notes. Data interpreted: Pulse oximetry: on room air snw 

      is 96 %. Interpretation: acceptable. Counseling: I had a detailed discussion with the       

      patient and/or guardian regarding: the historical points, exam findings, and any            

      diagnostic results supporting the discharge/admit diagnosis, the presence of at least       

      one elevated blood pressure reading (>120/80) during this emergency department visit,       

      lab results, radiology results, the need for outpatient follow up, for definitive care.     

      ED course: Pt and Spouse want pt home. Spouse states pt is not to go back to [place].     

      Mrs. Hernandez states their Son will be there tomorrow to assist in taking care of him. .     

                                                                                                  

                                                                                             

16:50 Order name: COVID-19                                                                    eb  

                                                                                             

17:00 Order name: Basic Metabolic Panel; Complete Time: 17:48                                 em  

                                                                                             

17:00 Order name: CBC with Diff; Complete Time: 17:24                                         em  

                                                                                             

17:00 Order name: LFT's; Complete Time: 17:48                                                 em  

                                                                                             

17:00 Order name: Magnesium; Complete Time: 17:48                                             em  

                                                                                             

17:00 Order name: NT PRO-BNP; Complete Time: 17:48                                            em  

                                                                                             

17:00 Order name: PT-INR; Complete Time: 17:24                                                em  

                                                                                             

17:00 Order name: Troponin (emerg Dept Use Only); Complete Time: 17:48                        em  

                                                                                             

17:00 Order name: EKG; Complete Time: 17:01                                                   em  

                                                                                             

17:00 Order name: Cardiac monitoring; Complete Time: 17:01                                    em  

                                                                                             

17:00 Order name: EKG - Nurse/Tech; Complete Time: 17:24                                      em  

                                                                                             

17:00 Order name: IV Saline Lock; Complete Time: 17:24                                        em  

                                                                                             

17:00 Order name: Labs collected and sent; Complete Time: 17:01                               em  

                                                                                             

17:00 Order name: O2 Per Protocol; Complete Time: 17:01                                       em  

                                                                                             

17:00 Order name: O2 Sat Monitoring; Complete Time: 17:01                                     em  

                                                                                                  

Administered Medications:                                                                         

17:18 Drug: Aspirin Chewable Tablet 324 mg Route: PO;                                         em  

18:45 Follow up: Response: No adverse reaction                                                em  

17:24 Drug: NS 0.9% 1000 ml Route: IV; Rate: 75 ml/hr; Site: left hand;                       em  

18:45 Follow up: IV Status: Order to discontinue infusion                                     em  

                                                                                                  

                                                                                                  

Disposition:                                                                                      

20 18:10 Discharged to Home. Impression: Chronic atrial fibrillation, Pleural effusion,     

  not elsewhere classified - s/p recent pneumonia.                                                

- Condition is Stable.                                                                            

- Discharge Instructions: Atrial Fibrillation, Heart Failure, Easy-to-Read.                       

                                                                                                  

- Medication Reconciliation Form, Thank You Letter, Antibiotic Education, Prescription            

  Opioid Use form.                                                                                

- Follow up: Private Physician; When: 2 - 3 days; Reason: Recheck today's complaints,             

  Continuance of care, Re-evaluation by your physician. Follow up: Emergency                      

  Department; When: As needed; Reason: Worsening of condition.                                    

                                                                                                  

                                                                                                  

                                                                                                  

Addendum:                                                                                         

2020                                                                                        

     20:16 Co-signature as Attending Physician, Sujit Tineo MD I agree with the assessment and  c
ha

           plan of care.                                                                          

                                                                                                  

Signatures:                                                                                       

Dispatcher [employer]                           [facility]                                                 

Sujit Tineo MD MD cha Therrien, Shelly, FNP-C                 FNP-Csnw                                                  

Erick Larios, RN                        RN   Racquel Vinson RN                      RN   ss                                                   

                                                                                                  

Corrections: (The following items were deleted from the chart)                                    

                                                                                             

17:30 17:01 Chest Single View+RAD.RAD.BRZ ordered. [facility]

18:46 18:10 2020 18:10 Discharged to Home. Impression: Chronic atrial fibrillation;     em  

      Pleural effusion, not elsewhere classified - s/p recent pneumonia. Condition is Stable.     

      Forms are Medication Reconciliation Form, Thank You Letter, Antibiotic Education,           

      Prescription Opioid Use. Follow up: Private Physician; When: 2 - 3 days; Reason:            

      Recheck today's complaints, Continuance of care, Re-evaluation by your physician.           

      Follow up: Emergency Department; When: As needed; Reason: Worsening of condition. snw       

                                                                                                  

**************************************************************************************************

## 2020-05-09 NOTE — XMS REPORT
Summary of Care

                             Created on:May 5, 2020



Patient:Vance Hernandez

Sex:Male

:1935

External Reference #:[id number]





Demographics







                          Address                   [address] [place] [address]

 

                          Mobile Phone              [phone]

 

                          Home Phone                [phone]

 

                          Phone                     [phone]

 

                          Email Address             [email or web address]

 

                          Preferred Language        English

 

                          Marital Status            

 

                          [place] Affiliation     Unknown

 

                          Race                      White

 

                          Ethnic Group              Not  or 









Author







                          Organization              [facility]

 

                          Address                   [address] [place] [id number]









Support







                Name            Relationship    Address         Phone

 

                Kassandra Hernandez Unavailable     [address]    [phone]



                                                [place] [address] 









Care Team Providers







                    Name                Role                Phone

 

                    Karel  Leah         Primary Care Provider [phone]









Reason for Visit







                          Reason                    Comments

 

                          Transition Of Care        







Encounter Details







             Date         Type         Department   Care Team    Description

 

             2020   Transition of Care [facility] [place]

 Transition Of 

Care



                                                [facility]



                                        



                                                    [phone] 







Allergies







             Active Allergy Reactions    Severity     Noted Date   Comments

 

             Sulfa (Sulfonamide Antibiotics) Shortness of Breath                 



documented as of this encounter (statuses as of 2020)



Medications







          Medication Sig       Dispensed Refills   Start Date End Date  Status

 

          aspirin 81 mg chewable Take 81 mg by           0                      

       Active



          tablet    mouth daily.                                         

 

          multivitamin tablet Take 1 tablet by           0                      

       Active



                    mouth daily.                                         

 

          tamsulosin 0.4 mg 24 Take 1 capsule by 30 capsule 5         2020

           Active



          hr capsuleIndications: mouth daily.                                   

      



          Right renal mass,                                                   



          Gross hematuria                                                   

 

          finasteride 5 mg Take 1 tablet by 90 tablet 3         2020      

     Active



          tabletIndications: mouth daily.                                       

  



          Hematuria, unspecified                                                

   



          type                                                        



documented as of this encounter (statuses as of 2020)



Active Problems







                          Problem                   Noted Date

 

                          Hematuria                 2020

 

                          Acute, but ill-defined, cerebrovascular disease 2015



documented as of this encounter (statuses as of 2020)



Social History







             Tobacco Use  Types        Packs/Day    Years Used   Date

 

             Never Assessed                                        









                          Sex Assigned at Birth     Date Recorded

 

                          Not on file               









                    Job Start Date      Occupation          Industry

 

                    Not on file         Not on file         Not on file









                    Travel History      Travel Start        Travel End









                                        No recent travel history available.









                    COVID-19 Exposure   Response            Date Recorded

 

                    In the last month, have you been in contact with No / Unsure

         2020  1:22 PM 

CDT



                    someone who was confirmed or suspected to have              

       



                    Coronavirus / COVID-19?                     



documented as of this encounter



Last Filed Vital Signs

Not on filedocumented in this encounter



Plan of Treatment







                Health Maintenance Due Date        Last Done       Comments

 

                DTaP,Tdap,and Td Vaccines (1 - Tdap) 10[birth year]19/1946                 

     

 

                Zoster Recombinant Vaccine (SHINGRIX) (1 of 2) 10/19/1986       

               

 

                Medicare Wellness Visit 10/19/2001                      

 

                PNEUMOCOCCAL VACCINES 65+ (1 of 2 - PCV13) 10/19/2001           

           

 

                INFLUENZA VACCINE (Season Ended) 2020                     

 



documented as of this encounter



Results

Not on filedocumented in this encounter



Insurance







          Payer     Benefit Plan / Subscriber ID Effective Dates Phone     Addre

ss   Type



                    Group                                             

 

          MEDICARE  MEDICARE PART xxxxxxxxxxx 10/1/2001-Omar [phone] [address] 

Medicare



                      [address]                 [id number]



                                        



                                                            [place] 



                                                            [id number] 



documented as of this encounter

## 2020-05-09 NOTE — ER
Nurse's Notes                                                                                     

 [facility]                                                                 

Name: Brett Hernandez                                                                                 

Age: 84 yrs                                                                                       

Sex: Male                                                                                         

: 1935                                                                                   

MRN: [id number]                                                                                   

Arrival Date: 2020                                                                          

Time: 16:29                                                                                       

Account#: [id number]                                                                            

Bed 30                                                                                            

Private MD:                                                                                       

Diagnosis: Chronic atrial fibrillation;Pleural effusion, not elsewhere classified-s/p recent      

  pneumonia                                                                                       

                                                                                                  

Presentation:                                                                                     

                                                                                             

16:32 Chief complaint: EMS states: Sent from [facility] after having      ss  

      inconclusive TB skin test and chest XRAY. PT denies cough, SOB and/or fever.                

      Coronavirus screen: Proceed with normal triage. Patient denies a cough. Patient denies      

      shortness of breath or difficulty breathing. Patient denies measured and/or subjective      

      temperature greater than 100.4F prior to today's visit. Patient denies travel on a          

      cruise ship or to a country the [facility] currently lists as an affected area. Patient denies     

      contact with known and/or suspected case of COVID-19. Ebola Screen: Patient denies          

      exposure to infectious person. Patient denies travel to an Ebola-affected area in the       

      21 days before illness onset. Initial Sepsis Screen: Does the patient meet any 2            

      criteria? HR > 90 bpm. Does the patient have a suspected source of infection? No.           

      Patient's initial sepsis screen is negative. Risk Assessment: Do you want to hurt           

      yourself or someone else? Patient reports no desire to harm self or others. Onset of        

      symptoms is unknown.                                                                        

16:32 Method Of Arrival: EMS: [facility]                                                         

16:32 Acuity: VITO 3                                                                           ss  

                                                                                                  

Historical:                                                                                       

- Allergies:                                                                                      

16:37 Sulfa (Sulfonamide Antibiotics);                                                        ss  

- PMHx:                                                                                           

16:37 CVA; Neoplasm of R kidney; cardiomegaly;                                                ss  

- PSHx:                                                                                           

16:37 Hernia repair;                                                                          ss  

                                                                                                  

- Immunization history:: Adult Immunizations up to date.                                          

                                                                                                  

                                                                                                  

Screenin:00 Abuse screen: Denies threats or abuse. Nutritional screening: No deficits noted.        em  

      Tuberculosis screening: No symptoms or risk factors identified. Fall Risk None              

      identified.                                                                                 

                                                                                                  

Assessment:                                                                                       

17:00 General: Appears in no apparent distress. comfortable, Behavior is calm, cooperative,   em  

      appropriate for age, Denies fever. Pain: Denies pain. Neuro: Level of Consciousness is      

      awake, alert, obeys commands, Oriented to person, place, time, situation, Appropriate       

      for age. Cardiovascular: Capillary refill < 3 seconds Patient's skin is warm and dry.       

      Respiratory: Airway is patent Respiratory effort is even, unlabored, Respiratory            

      pattern is regular, symmetrical, Breath sounds are diminished in left posterior lower       

      lobe and right posterior lower lobe Denies cough. GI: Abdomen is flat. Derm: Skin is        

      intact, is fragile, is thin, Skin is pink, warm \T\ dry. Musculoskeletal: Capillary         

      refill < 3 seconds.                                                                         

18:14 Reassessment: ALBINO Mina spoke with wife, she reports hx of AFIB, pt will be discharged em  

      home per family request.                                                                    

                                                                                                  

Vital Signs:                                                                                      

16:32  / 96; Pulse 107; Resp 18; Temp 98.4(TE); Pulse Ox 98% on R/A;                    ss  

17:58  / 105; Pulse 94; Resp 18; Temp 98.8(O); Pulse Ox 96% on R/A; Pain 0/10;          em  

18:45  / 89; Pulse 59; Resp 18; Pulse Ox 99% on R/A;                                    em  

                                                                                                  

ED Course:                                                                                        

16:29 Patient arrived in ED.                                                                  am2 

16:34 Triage completed.                                                                       ss  

16:37 Arm band placed on right wrist.                                                         ss  

16:56 Erick Larios, RN is Primary Nurse.                                                      em  

17:00 Patient has correct armband on for positive identification. Placed in gown. Bed in low  em  

      position. Call light in reach. Cardiac monitor on. Pulse ox on. NIBP on.                    

17:10 Inserted saline lock: 20 gauge in left hand, using aseptic technique. Blood collected.  em  

      inserted by ALBINO Mina.                                                                     

17:24 [facility]                                                        snw 

17:24 Sujit Tineo MD is Attending Physician.                                             snw 

18:33 No provider procedures requiring assistance completed. IV discontinued, intact,         em  

      bleeding controlled, No redness/swelling at site. Pressure dressing applied.                

                                                                                                  

Administered Medications:                                                                         

17:18 Drug: Aspirin Chewable Tablet 324 mg Route: PO;                                         em  

18:45 Follow up: Response: No adverse reaction                                                em  

17:24 Drug: NS 0.9% 1000 ml Route: IV; Rate: 75 ml/hr; Site: left hand;                       em  

18:45 Follow up: IV Status: Order to discontinue infusion                                     em  

                                                                                                  

                                                                                                  

Outcome:                                                                                          

18:10 Discharge ordered by MD.                                                                snw 

18:33 Discharged to home via ambulance.                                                       em  

18:33 Condition: good                                                                             

18:33 Discharge instructions given to patient, family, EMS, Instructed on discharge               

      instructions, follow up and referral plans. Demonstrated understanding of instructions,     

      follow-up care.                                                                             

18:46 Patient left the ED.                                                                    em  

                                                                                                  

Addendum:                                                                                         

2020                                                                                        

     11:17 Addendum: Other notified of negative COVID-19 swab results.                             d
m5

                                                                                                  

Signatures:                                                                                       

Eliana Lord, RN                    RN   dm5                                                  

Keeley Rooney, FNP-C                 FNP-Csnw                                                  

Erick Larios, RN                        Racquel Smith RN                      Allison Nunez                                                  

                                                                                                  

**************************************************************************************************

## 2020-05-09 NOTE — XMS REPORT
Summary of Care

                             Created on:May 2, 2020



Patient:Vance Hernandez

Sex:Male

:1935

External Reference #:[id number]





Demographics







                          Address                   [address] [place] [address]

 

                          Mobile Phone              [phone]

 

                          Home Phone                [phone]

 

                          Phone                     [phone]

 

                          Email Address             [email or web address]

 

                          Preferred Language        English

 

                          Marital Status            

 

                          Mosque Affiliation     Unknown

 

                          Race                      White

 

                          Ethnic Group              Not  or 









Author







                          Organization              [facility]

 

                          Address                   [address] [place] [address]









Support







                Name            Relationship    Address         Phone

 

                Kassandra Hernandez Unavailable     [address]    [phone]



                                                [place] [address] 









Care Team Providers







                    Name                Role                Phone

 

                    [facility] InstituteLeah mayfield         Primary Care Provider [phone]









Reason for Referral

Other (Routine)





           Status     Reason     Specialty  Diagnoses / Referred By Referred To



                                            Procedures Contact    Contact

 

                New Request                                     Diagnoses



Hematuria, unspecified type Germain Randall,         Germain Randall,



                                                                



Procedures



Discharge Follow-up: Specialty Provider GERMAIN RANDALL; 4-6 Weeks MD FERNANDO







                                                       [address] Count includes the [facility] [address] Count includes the [facility]



                                                                 [id number]



                                        [id number]







                                                       [place] [address]







                                                       Phone:     Phone:



                                                                 [phone]







                                                       Fax: [phone] Fax: [phone]





Other (Routine)





           Status     Reason     Specialty  Diagnoses / Referred By Referred To



                                            Procedures Contact    Contact

 

                New Request                                     Diagnoses



Hematuria, unspecified type



Right renal mass          Germain Randall,         Germain Randall,



                                                                



Procedures



Discharge Follow-up: Specialty Provider GERMAIN RANDALL; Other - See Comment MD FERNANDO







                                                       [address] Count includes the [facility] 301 Count includes the [facility]



                                                                 [id number]



                                        [id number]







                                                       [place], [place]



                                                                 [address]



                                        [address]







                                                       Phone:     Phone:



                                                                 [phone]







                                                       Fax: [phone] Fax: [phone]





MRI/CAT Scan (Routine)





           Status     Reason     Specialty  Diagnoses / Referred By Referred To



                                            Procedures Contact    Contact

 

                New Request                     Diagnostic      Diagnoses



Right renal mass                        Benjamín Dunham MD



                                        



                                                Radiology       



Procedures



CT ABDOMEN PELVIS WO CONTRAST [address] [place] [address] 



                                                                 [address]



                                        



                                                       Phone:     



                                                                 [phone]



                                        



                                                       Fax: [phone] 





MRI/CAT Scan (Routine)





           Status     Reason     Specialty  Diagnoses / Referred By Referred To



                                            Procedures Contact    Contact

 

                New Request                     Diagnostic      Diagnoses



[facility] D[facility]deep vein thrombosis) [facility]                 



                                                Rad[facility]       



Procedures



CT CHEST PULMONARY ANGIOGRAM            MD Germain



                                        



                                                       [address] [place], [place] [address]



                                        



                                                       Phone:     



                                                                 [phone]



                                        



                                                       Fax:       



                                                       [phone] 





Radiology Services (Routine)





           Status     Reason     Specialty  Diagnoses / Referred By Referred To



                                            Procedures Contact    Contact

 

                New Request                     Diagnostic      Diagnoses



Right renal mass          Hardy,                 



                                                Radiology       



Procedures



IR BIOPSY RENAL PERCUTANEOUS WITH ULTRASOUND



IR UROLOGIC                             MD Hernandez



                                        



                                                       [address] [place] [address].



                                        



                                                       [place], [place] 



                                                                 [address]



                                        



                                                       Phone:     



                                                                 [phone]



                                        



                                                       Fax:       



                                                       [phone] 





 (ASAP)





           Status     Reason     Specialty  Diagnoses / Referred By Referred To



                                            Procedures Contact    Contact

 

                New Request                     Vascular Surgery Procedures



                          Shirleytein,                 



                                                    BILATERAL VENOUS MD Germain



                                        



                                            DUPLEX LOWER [address] [place] [address] 



                                                    EXTREMITY BY [facility]



                                        



                                            VASCULAR [place], [place] 



                                                                 [address]



                                        



                                                       Phone:     



                                                                 [phone]



                                        



                                                       Fax:       



                                                       [phone] 





MRI/CAT Scan (STAT)





           Status     Reason     Specialty  Diagnoses / Referred By Referred To



                                            Procedures Contact    Contact

 

                New Request                     Diagnostic      Diagnoses



Hematuria, unspecified type [facility],                 



                                                Radiology       



Procedures



CT ABDOMEN PELVIS W WO CONTRAST         MD Germain



                                        



                                                       [address] [place] [address] 



                                                                 [address]



                                        



                                                       Phone:     



                                                                 [phone]



                                        



                                                       Fax:       



                                                       [phone] 









Reason for Visit

Auth/Cert





           Status     Reason     Specialty  Diagnoses / Referred By Referred To



                                            Procedures Contact    Contact

 

                                                Transplant Surgery Diagnoses



HEMATURIA, URINARY RETENTION, RENAL MASS                           Marianne aceves







                                                                  [address] [place], [place]



                                                                  [address]







                                                                  Phone:



                                                                  [phone]







                                                                  Fax:



                                                                  [phone]









Encounter Details







             Date         Type         Department   Care Team    Description

 

             2020 - Hospital Encounter Transplant/Gynecology/ [place],   

 Hematuria



                    2020                              Oncology (MARIANNE 9D)



                                        MD Germain



                                        



                                                            [address] [address]



                                        [id number]



                                        



                                       [phone] [place] [id number] [phone] 



                                                    (Fax)        







Allergies







             Active Allergy Reactions    Severity     Noted Date   Comments

 

             Sulfa (Sulfonamide Antibiotics) Shortness of Breath                 



documented as of this encounter (statuses as of 2020)



Medications







          Medication Sig       Dispensed Refills   Start Date End Date  Status

 

          aspirin 81 mg chewable Take 81 mg by           0                      

       Active



          tablet    mouth daily.                                         

 

          multivitamin tablet Take 1 tablet by           0                      

       Active



                    mouth daily.                                         

 

          tamsulosin 0.4 mg 24 Take 1 capsule by 30 capsule 5         2020

           Active



          hr capsuleIndications: mouth daily.                                   

      



          Right renal mass,                                                   



          Gross hematuria                                                   

 

          finasteride 5 mg Take 1 tablet by 90 tablet 3         2020      

     Active



          tabletIndications: mouth daily.                                       

  



          Hematuria, unspecified                                                

   



          type                                                        



documented as of this encounter (statuses as of 2020)



Active Problems







                          Problem                   Noted Date

 

                          Hematuria                 2020

 

                          Acute, but ill-defined, cerebrovascular disease 2015



documented as of this encounter (statuses as of 2020)



Social History







             Tobacco Use  Types        Packs/Day    Years Used   Date

 

             Never Assessed                                        









                          Sex Assigned at Birth     Date Recorded

 

                          Not on file               









                    Job Start Date      Occupation          Industry

 

                    Not on file         Not on file         Not on file









                    Travel History      Travel Start        Travel End









                                        No recent travel history available.









                    COVID-19 Exposure   Response            Date Recorded

 

                    In the last month, have you been in contact with No / Unsure

         2020  1:22 PM 

CDT



                    someone who was confirmed or suspected to have              

       



                    Coronavirus / COVID-19?                     



documented as of this encounter



Last Filed Vital Signs







                Vital Sign      Reading         Time Taken      Comments

 

                Blood Pressure  130/74          2020  4:00 PM CDT 

 

                Pulse           110             2020  4:00 PM CDT 

 

                Temperature     36.7 C (98 F) 2020  4:00 PM CDT 

 

                Respiratory Rate 17              2020  4:00 PM CDT 

 

                Oxygen Saturation 98%             2020  4:00 PM CDT 

 

                Inhaled Oxygen Concentration -               -               

 

                Weight          73.9 kg (163 lb) 2020  3:00 PM CDT 

 

                Height          180.3 cm (5' 11") 2020  3:00 PM CDT 

 

                Body Mass Index 22.73           2020  3:00 PM CDT 



documented in this encounter



Discharge Instructions

AttachmentsThe following attachments cannot be sent through Care Everywhere.
Hematuria (English)documented in this encounter



Progress Notes

Marshal Montoya RN - 2020 11:08 AM CDT



Care Management Discharge Disposition Note (DCDN)



5-2-1 Interventions: Disease specific education;Intensive medication 
reconciliation/management;Teachback;Home visit/home health referral;Clear 
discharge plan;Follow-up appointments;Follow-up phone calls

5-2-1 Providers: Physician;Care Manager/;Nurse

5-2-1 Patient Capacity Improvements: Avoidance of adverse 
events/readmission;Transportation arrangements



Discharge Plan for ongoing care and services:  Durable Medical Equipment;Home 
Health ()



Is this a new referral:  Yes



Patient Choice completed for referred services:  Yes



DME location: [facility] [address] [facility], [address] [address] [place], [place] () 
[phone] (F) [phone]

Other DME location:

Durable Medical Equipment: Bedside Commode;Noemi Lift



Home Health location: [facility], [address] [place] [address] 
() [phone] (F) [phone]



Discharge location(s):

                      Home Health location: [facility], [address] 
[place] [address] () [phone] (F) [phone]          DME location:
[address], [address] [place], [place] () [phone] (F) 
[phone]





Patient choice completed for referred services: Yes



Discussed with patient/patients family involved in decision making: Yes

Patient or family caregiver understands, and agrees with discharge plan.



Community resources/referrals made or provided to patient:  No



Resources/Referrals:



Transportation: Ambulance



Mental Status: Alert &amp; Oriented to Person,Place &amp; Time



Living Arrangement: Home

Other living arrangement:

Address of living arrangement: [address] [place] [address]



Funding Resources: Medicare A &amp; B



Nursing informed of discharge plan: Yes



Name of RN informed: Diane MUNSON



Estimated discharge date:  20  Time: 1600



Additional Information:



CM/ZOEY Name &amp; Contact number: MARSHAL MONTOYA RN     Ph. [phone]



The following information has been provided to the facility noted above: reason 
for the patient discharge or transfer; patients physical and psychosocial 
status; summary of care, treatment, servicesprovided to patient; and the patient
progress toward goals.

Electronically signed by Marshal Montoya RN at 2020 11:08 AM Piper Ching OT - 2020  2:51 PM CDTOCCUPATIONAL THERAPY NOTE:



Discharge Recommendations:

Primary Discharge Plan: Skilled nursing facility

If patient to discharge home today, recommend Home Health OT and Home with  
supervision and physical assistance

Equipment Recommendations: Defer to facility, if patient to return home would 
recommend bedside commode, mechanical lift



Precautions:

Weight bearing status: NA

General: PPE Utilized: Gloves and Surgical mask and Fall

Bracing: N/A



S: Patient agreeable to participate in occupational therapy. Patient reports 
that he thought he saw someone walking outside his window, but knows it was just
a hallucination.



PAIN

Pre-treatment: 2/10 pain at right ankle.

Post-treatment: 1/10 pain at right ankle.

Repositioning Provided.



O: Patient found semireclining in bed. Prevalon boots donned. Patient seen this 
date for the following:



Therapeutic Exercise/Procedure

 Educated Patient  about AROM for GABRIELLA UE: shoulder flexion/extension, shoulder
circumduction, scapular retraction/protraction, elbow flexion/extension, forearm
pronation/supination, wrist flexion/extension, and finger opposition.

 Patient/caregiver returns demonstration x 10 repetitions as follows: Tactile 
and verbal cues provided for correct technique.

 Educated about the importance of complying with all exercises to help 
increase overall strength, endurance, flexibility, and ROM for self-care 
independence.

 Patient/caregiver verbalized understanding to all discussed.



Patient left semireclining in bed with call bell in reach. Prevalon boots 
donned.



A: Patient exhibited Fair participation in therapy and responded well to 
treatment this session. Poor endurance and Persistent weakness remain(s) a 
limiting factor.  Patient continues to present with Decreased independence with 
ADL, Decreased functional ROM and Decreased strength/endurance for functional 
activity and will benefit from continued OT services to address above areas and 
improve functional status.



P: Functional motor treatment, Patient/Caregivier Education, Equipment 
recommendations, Daily livingactivities and Therapeutic exercises



TRENT Colunga OTR, MOT

Pager: [phone]

License: [id number]



Total Timed Treatment Codes: 24 Min

Total Treatment Time: 24 MinElectronically signed by Piper Colunga OT at 
2020  3:23 PM CDTSyd Zeng PTA - 2020  1:17 PM CDT Physical 
Therapy Progress Note:



Recommendations:

Primary discharge plan: skilled nursing facility

If patient desires to return home, patient will require physical assist with all
functional mobility, recommend use of noemi lift for transfers, wheelchair, 24/7
caregiver and home health PT

Equipment recommendations: defer to facility



PAIN:

denies pain



PRECAUTIONS:

Weight Bearing Precaution: WBAT

General Precautions: PPE used:Gloves and Surgical mask, General, Fall, oxygen: 
Room air

Bracing/Cast present or required:N/A



S: Patient agreeable to working with PT. Patient States "I thought I was leaving
today but my wife says she is waiting on the noemi lift to be delivered."



O: Patient met Semi reclined in bed.  Patient seen for the following:

 Therapeutic exercise:

 patient educated in Fall prevention, General strengthening, 
Relaxation/breathing techniques and Safety awareness. Patient supine and 
instructed in bilateral LE exercises x 20 reps to include: AP, Heel slides, Hip 
Abd/Add, and SLR, with frequent rest breaks due to LE velocity dependent for 
muscle spasms





After session, patient Semi reclined in bed and call bell provided.



A: Patient able to increase repetitions for leg exercises today. Patient 
tolerated session fair. Patient progressing toward goals #3.



P: PT will - progress strength, endurance, and mobility



TOI Zeng PTA

Pager # [phone]

Sup PT Sonia Bone



Total Timed Tx Codes in Minutes: 32 Min

Total Treatment Time in Minutes: 32 Min



Electronically signed by Syd Zeng PTA at 2020  1:51 PM Marshal Pizarro RN - 2020 10:11 AM CDTCARE COORDINATOR NOTE



CC obtained choice note for MARIOLA Hernandez

[id number]

[id number]



RE:  Care Management Patient Choice Notification



Your doctor has recommended that you have post-hospital care services at 
discharge.  You can choose the provider you want, regardless of its relationship
with [facility].  We will contact any of the agencieswithin the [facility] network, or any 
other agency upon your request.  The hospital must assist patients, their 
families, or the patients representative in selecting a post-acute care 
provider by using and sharing data that includes, but is not limited to, Home 
Health Agencies, Skilled Nursing Facilities, Inpatient Rehab, or Long Term Acute
Care, Nursing Home data on quality measures and data on resource use measures.



 Based on where you live and agency service areas, a list was generated from:



 Medicare.gov



Disclosure: [facility] owns or is affiliated with the following facilities/agencies:

                [facility] (skilled nursing and 
rehabilitation)



Patient Choice Acknowledgement



I, Vance Hernandez / authorized representative, am aware that I have choice in 
selecting post-hospital care providers. The hospital has given me a list of 
providers in the area available to me and/or my authorized representative. My 
choice(s) are listed below:



? HH: [facility], [address] [place] [address] (Ph) 
[phone] (F) [phone]



Your signature on this form indicates that you have been given the following 
information:



 I have been advised of my right to choose the providers I wish

? If skilled nursing facilities, long-term acute care hospitals, or home health 
agencies were recommended, I was given a list of facilities/agencies in my 
geographic area that deliver these services

or

? I have pre-selected or am an established client with a facility/agency and 
choose to initiate/continue services



__patient spouse verbally selected [facility]   ___                      20

Patient/Guardian/Responsible Party Signature                           Date

Signed copy placed on chart to be scanned into Electronic Medical Record



Marhsal DILLARD, RN, CCRN

Care Coordinator

[facility] Care Management

[email or web address]

[phone] signed by Marshal Montoya RN at 2020 10:12 AM 
CDTPHernandez maxwell MD - 2020  8:36 AM CDTFormatting of this note might 
be different from the original.

Urology Progress Note

2020 08:36



NAEO, AFVSS, pain well-controlled, tolerating diet, spending time in chair with 
assistance, continues to void without issue and hematuria largely resolved



Vitals:

 20 2330 20 0410 20 0733

BP: 130/78 124/86 (!) 148/93 129/85

Pulse: 94 87 85 89

Resp: 

Temp: 36.7 C (98 F) 36.4 C (97.5 F) 36.4 C (97.5 F) 36.6 C (97.8 
F)

TempSrc: Oral Oral Oral Axillary

SpO2: 97% 97% 98% 95%

Weight:

Height:



Temp (24hrs), Av.5 C (97.7 F), Min:36.4 C (97.5 F), Max:36.7 C (98
F)



PHYSICAL EXAM

Gen: no acute distress

CV: regular rate and rhythm

Resp: respirations even and unlabored on room air

Abd: soft, non-distended, non-tender, +BS

Ext: no edema



Intake/Output Summary (Last 24 hours) at 2020 0836

Last data filed at 2020 0000

Gross per 24 hour

Intake 1292 ml

Output 

Net 1292 ml



Labs:

CBC BMP PT/INR

WBC (10*3/L)

Date Value

2020 7.93

  NA (mmol/L)

Date Value

2020 132 (L)

  No results found for: PT

RBC (10*6/L)

Date Value

2020 3.30 (L)

 K (mmol/L)

Date Value

2020 3.1 (L)

 INR (no units)

Date Value

2020 1.1



PLT (10*3/L)

Date Value

2020 229

 CALCIUM (mg/dL)

Date Value

2020 7.5 (L)



HGB (g/dL)

Date Value

2020 9.9 (L)

  CL (mmol/L)

Date Value

2020 98

  aPTT

HCT (%)

Date Value

2020 30.0 (L)

  BUN (mg/dL)

Date Value

2020 27 (H)

  APTT Patient (Seconds)

Date Value

2020 29



 CREATININE (mg/dL)

Date Value

2020 1.11







A/P:

Vance Hernandez is a 84 year old male with PMH of CVA with hematuria and AUR now
resolved, CTAP withlarge renal mass and contralateral mass in left kidney, 
biopsy performed per IR, pathology pending.Patient progressing appropriately and
medically fit for discharging



- follow-up pathology

- pain control per MAR

- UOOBTC as able with nursing assistance

- continued work with PT while inpatient

- home health at time of discharge



Hernandez Hardy

Urology PGY-2

[phone]

Electronically signed by Germain Randall MD at 2020 11:47 AM CDT

Associated attestation - Germain Randall MD - 2020 11:47 AM CDTI 
personally examined the patient on 2020 and agree with Dr. Hardy's 
resident note as written.  I actively participated in the decision-making 
process.  Please see the resident's note for additional details.



Germain Randall MD  2020  11:47 AMAndresEdgar RN - 2020  
6:53 PM CDTCare Management Note



Addendum 6:53 PM



Followed up with patient's spouse  Kassandra Hernandez ([phone], 
[email or web address]) regarding home health choice. Patient's spouse would like 
list that only shows home health companies in patient'sarea of residence. 
Emailed the list below:



[facility] ([address] [place] [address], Phone: [phone], Fax: 
[phone])



[facility] [address], [place] [address] 
([phone] (F) [phone]



[facility] [address] [place] [address]  (PH) [phone]
[phone] (F) [phone]



[facility] - [address], [place], [place] [address] (P) 
[phone] (F) [phone]



 [facility] [address] [facility] [address] [place] [address] (Ph) [phone] (F) 
[phone]



[facility] ([address], [place]  (Ph) 
[phone] (F) [phone])



Also spoke to patient's spouse about PT/OT recs for wheelchair, patient lift, 
and bedside commode. Patient's spouse says that patient already has walker, 
rollator, wheelchair, and scooter, but does nothave a patient lift or bedside 
commode. CM emailed choice letter to patient's spouse with the following DME 
companies. Copy of choice letter is below. Referral sent to [employer]. CM 
covering on to follow up with patient's spouse for home health choice and 
also follow up with [facility] regarding DME referral.



[facility] ([address], [address] [place], [place] Phone: [phone], 
Fax: [phone], Email: [email or web address]),

 [facility] ([address], [address], [place] [address], Phone: 
[phone], Fax: [phone]), [facility] ([facility], [address] , [place], [phone] fax 
[phone])



CM also spoke with patient's spouse about getting in contact with a skilled 
nursing facility in their area as she said that she may want a SNF in the area 
to evaluate the patient for admission to SNF if she determines that the patient 
needs to be placed and cannot be at home. However, patient/spouse would like 
patient to go home first.



Addendum 5:47 PM



Home health order written. CM to follow up with patient's spouse for home health
choice and send order and referral to chosen company.



2020 4:11 PM



Had extensive discussion with patient and his spouse, Kassandra David 
([phone], [email or web address]) via telephone. Patient asked CM to speak 
with patient's spouse regarding discharge planning. Patient's spouse does not 
want patient to go to SNF because she would not be able to visit him at a SNF 
and she is concerned that he would decline at a SNF vs at home. Patient's spouse
has ordered equipment for patient's use at home that will be delivered to 
patient's home on . Patient's spouse wouldlike patient to go home with home 
health and she would like to review list of home health companies that service 
their zip code. List of home health companies sent to patient's spouse along 
with requested information about applying for Medicaid. Referral to home health 
company will be sent once CM receives choice of home health company from 
patient/family. Notified primary team Dr. Wadsworth.



Edgar Ndiaye (JV), RN-BC, BSN

Care Coordinator

[facility] Care Management

Cell: [phone] (not for patient use)

Office: [phone]

Fax: [phone]

[email or web address]



Vance Hernandez

[id number]

1935



RE:  Care Management Patient Choice Notification



Your doctor has recommended that you have post-hospital care services at 
discharge.  You can choose the provider you want, regardless of its relationship
with [facility].  We will contact any of the agencieswithin the [facility] network, or any 
other agency upon your request.  The hospital must assist patients, their 
families, or the patients representative in selecting a post-acute care 
provider by using and sharing data that includes, but is not limited to, Home 
Health Agencies, Skilled Nursing Facilities, Inpatient Rehab, or Long Term Acute
Care, Nursing Home data on quality measures and data on resource use measures.



 Based on where you live and agency service areas, a list was generated from:



 Medicare.gov



Disclosure: [facility] owns or is affiliated with the following facilities/agencies:

                [facility] (skilled nursing and 
rehabilitation)



Patient Choice Acknowledgement



I, Vance Hernandez / authorized representative, am aware that I have choice in 
selecting post-hospital care providers. [facility] has given me a list of 
providers in the area available to me and/or my authorized representative. My 
choice(s) are listed below:



? Home Health: Patient/Family will choose.

Durable Medical Equipment: [facility] ([address], [address] [place], [place] Phone: [phone], Fax: [phone], Email: [email or web address]), [facility] ([address], [address], [place] [address], Phone: 
[phone], Fax: [phone]), [facility] ([facility], [address] , [place], [phone] fax 
[phone]),



Your signature on this form indicates that you have been given the following 
information:



 I have been advised of my right to choose the providers I wish

? If skilled nursing facilities, long-term acute care hospitals, or home health 
agencies were recommended, I was given a list of facilities/agencies in my 
geographic area that deliver these services

or

? I have pre-selected or am an established client with a facility/agency and 
choose to initiate/continue services



______________________________________                      20

Patient/Guardian/Responsible Party Signature                           Date

Signed copy placed on chart to be scanned into Electronic Medical Record



Electronically signed by Edgar Ndiaye RN at 2020  6:59 PM CDT
Richard Wadsworth MD - 2020  1:25 PM CDTFormatting of this note might be 
different from the original.

Urology Progress Note

2020 13:25



S:

FELIPE VELASCO

Sam d/c'd yesterday without issue. Patient urinating without difficulty.

PT/OT recommend SNF for patient after evaluation.



Vitals:

 20 2350 20 0400 20 0800 20 1200

BP: 110/75 121/87 129/84 111/62

Pulse: 82 96 91 90

Resp: 17 18 18 18

Temp: 36.6 C (97.8 F) 36.6 C (97.8 F) 36.6 C (97.9 F) 36.4 C (97.6
F)

TempSrc: Oral Oral Oral Axillary

SpO2: 95% 95% 97% 95%

Weight:

Height:



Intake/Output Summary (Last 24 hours) at 2020 1325

Last data filed at 2020 1600

Gross per 24 hour

Intake 580 ml

Output 

Net 580 ml



UOP: Not recorded



PHYSICAL EXAM

Gen: no acute distress

CV: regular rate and rhythm

Resp: respirations even and unlabored on room air

Abd: soft, non-distended, non-tender, +BS

Ext: BLE edema



CREATININE (mg/dL)

Date Value

2020 1.11



Radiology:

Ct Abdomen Pelvis Wo Contrast



Result Date: 2020

Status post right renal and retroperitoneal mass biopsy, respectively. No 
perinephric or retroperitoneal hematoma. Preliminary Report Dictated by 
Resident: Herman Subramanian I, Ramon Desai MD., have reviewed this study and 
agree with the above report.



Ct Chest Pulmonary Angiogram



Result Date: 2020

No acute pulmonary embolism through the proximal segmental level. Mild pulmonary
edema with cardiomegaly. Small right pleural effusion. Right lower lobe 
atelectasis Dilated main pulmonary artery, measures 3.6 cm, is nonspecific but 
may represent pulmonary arterial hypertension. Large right renal mass which is 
better evaluated and characterized on CT abdominal pelvis 4 2020 
Preliminary Report Dictated by Resident: Josh Dunham I, Gely Balbuena MD., have 
reviewed this study and agree with the above report.



Ir Biopsy Renal Percutaneous With Ultrasound



Result Date: 2020

Status post right renal and retroperitoneal mass biopsy, respectively. No 
perinephric or retroperitoneal hematoma. Preliminary Report Dictated by 
Resident: Herman Subramanian I, Ramon Desai MD., have reviewed this study and 
agree with the above report.



A/P:

Vance Hernandez, 84 year old male with PMH of CVA (on ASA) and recent DVT who 
presented with hematuria and urinary retention with CT A/P study concerning for 
large renal/adrenal mass on right kidney.



- Follow up Aldosterone, Renin Activity, Catecholamines, and AM Cortisol for 
workup of mass.

- Follow up IR Biospy Pathology

- PT/OT: Recommend SNF

- Pain: Tylenol PRN

- Diet: Regular

- DVT: SCDs

- Dispo: Care Management has been contacted regarding SNF placement.



Richard Wadsworth PGY-1

Urology



Electronically signed by Germain Randall MD at 2020  7:29 PM CDT

Associated attestation - Germain Randall MD - 2020  7:29 PM CDTI 
personally examined the patient on 2020   and agree with Dr. Wadsworth's 
resident note as written .  I actively participated in the decision-making 
process.  Please see the resident's note for additional details.



Germain Randall MD  2020  7:29 PMSyd Zeng PTA - 2020  8:56 
AM CDT Physical Therapy Progress Note:



Recommendations:

Primary discharge plan: skilled nursing facility

If patient desires to return home, patient will require physical assist with all
functional mobility, recommend use of noemi lift for transfers, wheelchair, 
caregiver and home health PT

Equipment recommendations: defer to facility



PAIN:

denies pain



PRECAUTIONS:

Weight Bearing Precaution: WBAT

General Precautions: PPE used:Gloves and Surgical mask, General, Fall, oxygen: 
Room air

Bracing/Cast present or required:N/A



S: Patient agreeable to working with PT.



O: Patient met Semi reclined in bed.  Patient seen for the following:

 Therapeutic exercise:

 patient educated in Fall prevention, General strengthening, 
Relaxation/breathing techniques and Safety awareness. Patient supine and 
instructed in bilateral LE exercises x 15 reps to include: AP, Heel slides, Hip 
Abd/Add, and SLR, with frequent rest breaks due to muscle spasms





After session, patient Semi reclined in bed and call bell provided.



A: Patient with increased muscle spasms during leg exercises, however, patient 
able to increase ROM today. Patient tolerated session fair. Patient progressing 
toward goals #3.



P: PT will - progress strength, endurance, and mobility



TOI Zeng PTA

Pager # [phone]

Sup PT Sonia Lizandro



Total Timed Tx Codes in Minutes: 30 Min

Total Treatment Time in Minutes: 30 Min



Electronically signed by Syd Zeng PTA at 2020  2:08 PM Sonia Anne LMSW - 2020  3:50 PM CDTSocial Worker Note



SW contacted patient's wife, Kassandra Hernandez [phone] to provide 
supportive care services and assistance with discharge planning. Dicussed care 
needs and possible PT referral for SNF. Wife statesthat patient has been 
declining at home. She states that patient has had home health PT in the past b
ut could not recall name of agency. She also indicated that she would not be 
opposed to SNF placement, but would prefer HH as first choice.



Plan is to follow up with appropriate referral after PT consult.



Sonia Bocanegra LMSW

Care Management - 

Office: [phone]

Fax: [phone] signed by Sonia Bocanegra LMSW at 2020  
3:53 PM Sonia Cerrato, PT - 2020  9:46 AM CDT2020

9:46 AM



Physical Therapy Note



PT consult received and chart reviewed. Attempted to see patient for initial 
evaluation, however, patient is currently away from unit for procedure. PT will 
attempt another time as schedule permits.



Sonia Bone PT, DPT

Pager Number: [phone]



Total time treatments:0

Total treatment time:0

Electronically signed by Sonia Bone PT at 2020  9:48 AM CDT
Richard Wadsworth MD - 2020  7:11 AM CDTFormatting of this note might be 
different from the original.

Urology Progress Note

2020 07:12



S:

FELIPE VELASCO

CBI continuing through night without complication.

B/l Venous Doppler Studies did not reveal DVTs.



Vitals:

 20 1600 20 1951 20 0000 20 0400

BP: 94/55 103/67 110/70 135/88

Pulse: 80 90 90 96

Resp: 18 18 16 18

Temp: 36.8 C (98.2 F) 36.7 C (98 F) 36.5 C (97.7 F) 36.8 C (98.2 
F)

TempSrc: Oral Oral Oral Oral

SpO2: 94% 98% 97% 96%

Weight:

Height:



Intake/Output Summary (Last 24 hours) at 2020 0712

Last data filed at 2020 0556

Gross per 24 hour

Intake 

Output 8200 ml

Net -8200 ml



UOP: N/A as patient on CBI



PHYSICAL EXAM

Gen: no acute distress

CV: regular rate and rhythm

Resp: respirations even and unlabored on room air

Abd: soft, non-distended, non-tender, +BS

Ext: BLE edema



CBI in place, 3-Way Sam in place draining irwin-aid colored urine in bag, clear
urine in tubing



CREATININE (mg/dL)

Date Value

2020 1.11



Radiology:

Ct Abdomen Pelvis W Wo Contrast



Result Date: 2020

1.  Large lobulated exophytic mass off the upper pole of the right kidney 
measures up to 12.8 cm andis concerning for renal cell carcinoma. The mass abuts
the right adrenal gland and results in anterior displacement. The mass abuts the
right hemidiaphragm and the posterior abdominal wall with suspected focal 
invasion (7:31 and 605:107). No vascular invasion, matt, or bone metastasis 
detected. 2.  Aheterogeneously enhancing mass at the interpolar left kidney 
measures 1.9 cm and is indeterminate but also concerning for renal cell 
carcinoma. Hyperdensity within the bladder likely represents blood. Sam 
catheter is seen in the urinary bladder along with focal iatrogenic air. 3.  
Small right-sided pleural effusion and right lower lobe atelectasis. Preliminary
Report Dictated by Resident: Isaias Davis I, Carolina Mcgrath MD., 
have reviewed this study and agree with the above report.



A/P:

Vance Hernandez, 84 year old male with PMH of CVA (on ASA) and recent DVT who 
presented with hematuria and urinary retention with CT A/P study concerning for 
large renal/adrenal mass on right kidney.



- D/c CBI, will place catheter plug in CBI port

- Follow up Aldosterone, Renin Activity, Catecholamines, and AM Cortisol for 
workup of mass.

- IR Mass Biopsy today

- PT/OT consult

- Pain: Tylenol PRN

- Diet: Regular

- DVT: SCDs



Richard Wadsworth PGY-1

Urology



Electronically signed by Germain Randall MD at 2020  9:17 AM CDT

Associated attestation - Germain Randall MD - 2020  9:17 AM CDTI 
personally examined the patient on 2020 and agree with Dr. Wadsworth's 
resident note as written .  I actively participated in the decision-making 
process.  Please see the resident's note for additional details.



Germain Randall MD  2020  9:16 Marshal Ball RN - 2020  1:35 PM 
CDTCare Management Social Functional Assessment

Patient Name: Vance Hernandez     Age: 84 year old     Sex: male     MRN: 
[id number]

Previous admit date: N/A



Current diagnosis and co-morbidities:

HEMATURIA, URINARY RETENTION, RENAL MASS



Readmission Questions:

Was patient discharged from any acute care hospital within the last 30 days: No



Social Functional Assessment:

Primary language spoken/preferred: English

Mental Status: Alert &amp; Oriented to Person,Place &amp; Time

Information given by: Self

Patient's support system: Spouse

Name and number of support system: Kassandra Hernandez (spouse) [phone]

Primary Care Giver: Self

MPOA: No

Living Arrangement: Home

Address of living arrangement : [address] [place] [address] [place] [address]

Persons living in home: Same as support system

Barriers to returning home: None

Baseline functional status- ambulation: Independent

Functional status-baseline personal care: Independent

Baseline functional status- driving: Independent

Baseline functional status- grocery shopping: Independent

Functional status-baseline housekeeping: Independent

Functional status-baseline meal prep: Independent

Current functional status same as prior: Yes

Do you have a PCP?: Yes

Name of PCP: Kortney Vinson

Home Health Care Agency: No

Provider Services: No

DME Company: No

Equipment: Walker;Wheelchair: Manual

Hemodialysis: No

Funding Resources: Medicare A &amp; B

Prescription coverage plan: Medicare Part D

Pharmacy where meds are filled: Other

Other pharmacy: [facility]

Anticipated services prior to disharge: Continue Medical Eval

Expected mode of discharge transportation: Same as support system

Additional Recommendations for DC: Pt states that he can afford all currently 
prescibed medications and intends to go home with wife at time of discharge. No 
needs identified at this time.

Additional info required for discharge planning: Pending medical evaluation

Recommended discharge plan: Home



SFA Complete:

Social Functional Assessment complete: Yes



Alcohol Use Screening (AUDIT-C)

How often do you have a drink containing alcohol?: Never

SCORE: 0

Did patient elect to have resources provided: No



Role of Care Management explained.

Marshal DILLARD, RN, CCRN

Care Coordinator

[facility] Care Management

[email or web address]

[phone]

Electronically signed by Marshal Montoya RN at 2020  1:35 PM CDTBRichard alcantara MD - 2020  1:18 PM CDTFormatting of this note might be different 
from the original.

Urology Progress Note

2020 13:18



S:

FELIPE VELASCO

CBI continuing through night without complication. CT A/P completed last night

Performed manual bladder irrigation via 3-way hematuria catheter with clot 
returned.



Vitals:

 20 0000 20 0300 20 0800 20 1200

BP: 132/86 (!) 151/93 136/90 (!) 146/70

Pulse: 99 96 88 99

Resp: 18 18 18 18

Temp: 36.2 C (97.1 F) 36.2 C (97.1 F) 36.5 C (97.7 F) 36.6 C (97.8
F)

TempSrc: Oral Oral Oral Oral

SpO2: 97% 99% 93% 99%

Weight:

Height:



Temp (24hrs), Av.4 C (97.6 F), Min:36.2 C (97.1 F), Max:36.7 C (98
F)



Intake/Output Summary (Last 24 hours) at 2020 1318

Last data filed at 2020 1200

Gross per 24 hour

Intake 

Output 94618 ml

Net -76250 ml



UOP: N/A as patient on CBI



PHYSICAL EXAM

Gen: no acute distress

CV: regular rate and rhythm

Resp: respirations even and unlabored on room air

Abd: soft, non-distended, non-tender, +BS

Ext: BLE edema



CBI in place, 3-Way Sam in place draining red-tinged irwin-aid colored urine



CREATININE (mg/dL)

Date Value

2020 1.15



Radiology:

Ct Abdomen Pelvis W Wo Contrast



Result Date: 2020

1.  Large lobulated exophytic mass off the upper pole of the right kidney 
measures up to 12.8 cm andis concerning for renal cell carcinoma. The mass abuts
the right adrenal gland and results in anterior displacement. The mass abuts the
right hemidiaphragm and the posterior abdominal wall with suspected focal 
invasion (7:31 and 605:107). No vascular invasion, matt, or bone metastasis 
detected. 2.  Aheterogeneously enhancing mass at the interpolar left kidney 
measures 1.9 cm and is indeterminate but also concerning for renal cell 
carcinoma. Hyperdensity within the bladder likely represents blood. Sam 
catheter is seen in the urinary bladder along with focal iatrogenic air. 3.  
Small right-sided pleural effusion and right lower lobe atelectasis. Preliminary
Report Dictated by Resident: Carolina Pinto MD., 
have reviewed this study and agree with the above report.



A/P:

Vance Hernandez, 84 year old male with PMH of CVA (on ASA) and recent DVT who 
presented with hematuria and urinary retention with CT A/P study concerning for 
large renal/adrenal mass on right kidney.



- Continue CBI

- Order BLE Venous Doppler Studies given recent history of DVT and limited 
ambulation

- Order Aldosterone, Renin Activity, Catecholamines, and AM Cortisol for workup 
of mass.

- Continue inpatient admission on floor.

- Pain: Tylenol PRN

- Diet: Regular

- DVT: SCDs



Richard Wadsworth PGY-1

Urology



Electronically signed by Germain Randall MD at 2020  9:48 PM CDT

Associated attestation - Germain Randall MD - 2020  9:48 PM CDTI 
personally examined the patient on 2020 and agree with Dr. Wadsworth's 
resident note as written .  I actively participated in the decision-making 
process.  Please see the resident's note for additional details. Plan for biopsy
of right renal and suprarenal masses.



Germain Randall MD  2020  9:47 PMdocumented in this encounter



Plan of Treatment







                Health Maintenance Due Date        Last Done       Comments

 

                DTaP,Tdap,and Td Vaccines (1 - Tdap) 10/19/1946                 

     

 

                Zoster Recombinant Vaccine (SHINGRIX) (1 of 2) 10/19/1986       

               

 

                Medicare Wellness Visit 10/19/2001                      

 

                PNEUMOCOCCAL VACCINES 65+ (1 of 2 - PCV13) 10/19/2001           

           

 

                INFLUENZA VACCINE (Season Ended) 2020                     

 



documented as of this encounter



Procedures







             Procedure Name Priority     Date/Time    Associated Diagnosis Comme

nts

 

             CT CHEST PULMONARY Routine      2020 12:23 History of DVT (de

ep Results for 

this



             ANGIOGRAM                 PM CDT       vein thrombosis) procedure a

re in



                                                                 the results



                                                                 section.

 

             CT ABDOMEN PELVIS WO Routine      2020 11:34 Right renal mass

 Results for this



             CONTRAST                  AM CDT                    procedure are i

n



                                                                 the results



                                                                 section.

 

             IR BIOPSY RENAL Routine      2020 11:15 Right renal mass Resu

lts for this



             PERCUTANEOUS WITH              AM CDT                    procedure 

are in



             ULTRASOUND                                          the results



                                                                 section.

 

             CYTO ORGAN   Routine      2020 10:30 Right renal mass Results

 for this



             ASPIRATION-FNA              AM CDT                    procedure are

 in



                                                                 the results



                                                                 section.

 

                CYTO ORGAN      Routine         2020 10:26 Right renal mas

s



                                        Results for this



             ASPIRATION-FNA              AM CDT       Gross hematuria procedure 

are in



                                                                 the results



                                                                 section.

 

             CBC WITH DIFFERENTIAL Routine      2020  1:16              Re

sults for this



                                       AM CDT                    procedure are i

n



                                                                 the results



                                                                 section.

 

             PROTHROMBIN TIME / Routine      2020  1:16              Resul

ts for this



             INR                       AM CDT                    procedure are i

n



                                                                 the results



                                                                 section.

 

             BASIC METABOLIC PANEL Routine      2020  1:16              Re

sults for this



             (NA, K, CL, CO2,              AM CDT                    procedure a

re in



             GLUCOSE, BUN,                                        the results



             CREATININE, CA)                                        section.

 

             RENIN ACTIVITY ASAP         2020 12:31              Results f

or this



                                       PM CDT                    procedure are i

n



                                                                 the results



                                                                 section.

 

             ALDOSTERONE, SERUM ASAP         2020 12:31              Resul

ts for this



                                       PM CDT                    procedure are i

n



                                                                 the results



                                                                 section.

 

             BILATERAL VENOUS ASAP         2020 11:15              



             DUPLEX LOWER              AM CDT                    



             EXTREMITY BY VASCULAR                                        



             LAB                                                 

 

             CATECHOLAMINES, ASAP         2020 10:24              Results 

for this



             PLASMA FRACT              AM CDT                    procedure are i

n



                                                                 the results



                                                                 section.

 

             BASIC METABOLIC PANEL Routine      2020  3:24              Re

sults for this



             (NA, K, CL, CO2,              AM CDT                    procedure a

re in



             GLUCOSE, BUN,                                        the results



             CREATININE, CA)                                        section.

 

             CT ABDOMEN PELVIS W STAT         2020 11:59 Hematuria,   Resu

lts for this



             WO CONTRAST               PM CDT       unspecified type procedure a

re in



                                                                 the results



                                                                 section.

 

             CORONAVIRUS COVID-19 STAT         2020  5:51              Res

ults for this



             TESTING                   PM CDT                    procedure are i

n



                                                                 the results



                                                                 section.

 

             CBC WITH DIFFERENTIAL STAT         2020  5:04              Re

sults for this



                                       PM CDT                    procedure are i

n



                                                                 the results



                                                                 section.

 

             URINE CULTURE Routine      2020  5:04              Results fo

r this



                                       PM CDT                    procedure are i

n



                                                                 the results



                                                                 section.

 

             FIBRINOGEN   STAT         2020  5:04              Results for

 this



                                       PM CDT                    procedure are i

n



                                                                 the results



                                                                 section.

 

             ACTIVATED PARTIAL STAT         2020  5:04              Result

s for this



             THRMPLAS MAIN              PM CDT                    procedure are i

n



                                                                 the results



                                                                 section.

 

             PROTHROMBIN TIME / STAT         2020  5:04              Resul

ts for this



             INR                       PM CDT                    procedure are i

n



                                                                 the results



                                                                 section.

 

             CBC WITH DIFFERENTIAL STAT         2020  5:04              Re

sults for this



                                       PM CDT                    procedure are i

n



                                                                 the results



                                                                 section.

 

             COMP. METABOLIC PANEL STAT         2020  5:04              Re

sults for this



             ([id number])                   PM CDT                    procedure are i

n



                                                                 the results



                                                                 section.

 

             MAGNESIUM    STAT         2020  5:04              Results for

 this



                                       PM CDT                    procedure are i

n



                                                                 the results



                                                                 section.

 

             PHOSPHORUS   STAT         2020  5:04              Results for

 this



                                       PM CDT                    procedure are i

n



                                                                 the results



                                                                 section.



documented in this encounter



Results

CT CHEST PULMONARY ANGIOGRAM (2020 12:23 PM CDT)





                                        Specimen

 

                                        









                          Impressions               Performed At

 

                                         



                                        PACS/VR/DOSE



                                        No acute pulmonary embolism through the 

proximal segmental level.



                                        



                                         



                                        



                                        Mild pulmonary edema with cardiomegaly.



                                        



                                         



                                        



                                        Small right pleural effusion.



                                        



                                         



                                        



                                        Right lower lobe atelectasis



                                        



                                         



                                        



                                        Dilated main pulmonary artery, measures 

3.6 cm, is nonspecific but may



                                        



                                        represent pulmonary arterial hypertensio

n.



                                        



                                         



                                        



                                        Large right renal mass which is better e

valuated and characterized on CT



                                        



                                        abdominal pelvis 4 2020



                                        



                                         



                                        



                                        Preliminary Report Dictated by Resident:

 Josh Dunham I, Gely Balbuena MD., have reviewed this

 study and agree with the above



                                        



                          report.                   









                          Narrative                 Performed At

 

                                        CT CHEST PULMONARY ANGIOGRAM



                                        PACS/VR/DOSE



                                         



                                        



                                        HISTORY: 84 years-old; Male; PE suspecte

d, intermediate prob, positive



                                        



                                        D-dimer 



                                        



                                         



                                        



                                        COMPARISON: No study prior to 2020 

available



                                        



                                         



                                        



                                        TECHNIQUE: Our interpretation of studies

 performed on 2020 at an



                                        



                                        outside institution is limited by factor

s including the absence of



                                        



                                        technical specifics of the image and und

isclosed clinical information.



                                        



                          Specialists at the institution that performed the stud

y may have access 



                                        to



                                        



                                        information not available to us that cou

ld make a difference in this



                                        



                          interpretation. We suggest that you obtain the origina

l interpretation 



                                        from



                                        



                                        the site where the study was performed.



                                        



                                         



                                        



                                        FINDINGS 



                                        



                                         



                                        



                                        PULMONARY ARTERIES:



                                        



                                         



                                        



                          Enhancement is adequate. There is no acute or chronic 

pulmonary embolism 



                                        in



                                        



                                        the main pulmonary artery and proximal s

egmental level.



                                        



                                         



                                        



                                        CHEST:



                                        



                                         



                                        



                                        Lower neck/thyroid: Unremarkable.



                                        



                                         



                                        



                                        Lungs: Motion artifact degrades the imag

e. Septal thickening and diffuse



                                        



                          hazy appearance of the lung parenchyma. Mild parasepta

l emphysema is 



                                        noted.



                                        



                                        Streaky opacity in the basal segment of 

the right lower lobe is noted.



                                        



                                        Dependent streaky opacities is consisten

t with subsegmental atelectasis



                                        



                                         



                                        



                                        Central airway: Unremarkable.



                                        



                                         



                                        



                                        Pleura: Small right pleural effusion wit

h simple internal density. No



                                        



                                        pleural thickening or pneumothorax.



                                        



                                         



                                        



                          Thoracic aorta and great vessels: Mild atherosclerotic

 calcification of 



                                        the



                                        



                                        aorta. The ascending thoracic aorta rani

ures 3.9 cm.



                                        



                                         



                                        



                          Pulmonary arteries: Normal in caliber. The main pulmon

josé miguel artery 



                                        measures



                                        



                                        3.6 cm.



                                        



                                         



                                        



                          Heart and pericardium: Mild coronary arterial calcific

ations are 



                                        present.



                                        



                                        Mild cardiomegaly.



                                        



                                         



                                        



                                        Lymph nodes: No enlarged thoracic lymph 

nodes.



                                        



                                         



                                        



                                        Mediastinum: Unremarkable.



                                        



                                         



                                        



                          Thoracic spine and chest wall: Unremarkable, with norm

al thoracic 



                                        vertebral



                                        



                                        body heights.



                                        



                                         



                                        



                                        Other Lines/Tubes/Devices/Hardware: None



                                        



                                         



                                        



                          Visualized upper abdomen: A large lobulated exophytic 

mass arising from 



                                        the



                                        



                          superior pole of the right kidney, better evaluated an

d characterized on 



                                        CT



                                        



                                        abdomen pelvis from 2020.



                                        



                                         



                                        



                                                    









                                        Procedure Note

 

                                        [facility], Radiant Results [facility] User - 2020  5:27 PM CDT



CT CHEST PULMONARY ANGIOGRAM



                                        



                                        HISTORY: 84 years-old; Male; PE suspecte

d, intermediate prob, positive



                                        D-dimer



                                        



                                        COMPARISON: No study prior to 2020 

available



                                        



                                        TECHNIQUE: Our interpretation of studies

 performed on 2020 at an



                                        outside institution is limited by factor

s including the absence of



                                        technical specifics of the image and und

isclosed clinical information.



                                        Specialists at the institution that perf

ormed the study may have access to



                                        information not available to us that cou

ld make a difference in this



                                        interpretation. We suggest that you obta

in the original interpretation from



                                        the site where the study was performed.



                                        



                                        FINDINGS



                                        



                                        PULMONARY ARTERIES:



                                        



                                        Enhancement is adequate. There is no acu

te or chronic pulmonary embolism in



                                        the main pulmonary artery and proximal s

egmental level.



                                        



                                        CHEST:



                                        



                                        Lower neck/thyroid: Unremarkable.



                                        



                                        Lungs: Motion artifact degrades the imag

e. Septal thickening and diffuse



                                        hazy appearance of the lung parenchyma. 

Mild paraseptal emphysema is noted.



                                        Streaky opacity in the basal segment of 

the right lower lobe is noted.



                                        Dependent streaky opacities is consisten

t with subsegmental atelectasis



                                        



                                        Central airway: Unremarkable.



                                        



                                        Pleura: Small right pleural effusion wit

h simple internal density. No



                                        pleural thickening or pneumothorax.



                                        



                                        Thoracic aorta and great vessels: Mild a

therosclerotic calcification of the



                                        aorta. The ascending thoracic aorta rani

ures 3.9 cm.



                                        



                                        Pulmonary arteries: Normal in caliber. T

he main pulmonary artery measures



                                        3.6 cm.



                                        



                                        Heart and pericardium: Mild coronary art

erial calcifications are present.



                                        Mild cardiomegaly.



                                        



                                        Lymph nodes: No enlarged thoracic lymph 

nodes.



                                        



                                        Mediastinum: Unremarkable.



                                        



                                        Thoracic spine and chest wall: Unremarka

ble, with normal thoracic vertebral



                                        body heights.



                                        



                                        Other Lines/Tubes/Devices/Hardware: None



                                        



                                        Visualized upper abdomen: A large lobula

kya exophytic mass arising from the



                                        superior pole of the right kidney, brando

r evaluated and characterized on CT



                                        abdomen pelvis from 2020.



                                        



                                        



                                        IMPRESSION



                                        



                                        No acute pulmonary embolism through the 

proximal segmental level.



                                        



                                        Mild pulmonary edema with cardiomegaly.



                                        



                                        Small right pleural effusion.



                                        



                                        Right lower lobe atelectasis



                                        



                                        Dilated main pulmonary artery, measures 

3.6 cm, is nonspecific but may



                                        represent pulmonary arterial hypertensio

n.



                                        



                                        Large right renal mass which is better e

valuated and characterized on CT



                                        abdominal pelvis 4 2020



                                        



                                        Preliminary Report Dictated by Resident:

 Josh Dunham I, Gely Balbuena MD., have reviewed this 

study and agree with the above



                                        report.









                Performing Organization Address         City/State/Zipcode Phone

 Number

 

                PACS/VR/DOSE                                    



CT ABDOMEN PELVIS WO CONTRAST (2020 11:34 AM CDT)





                                        Specimen

 

                                        









                                        Addenda

 

                                        Addendum by Ramon Desai DO on 04/3

  2:06 PM







                                        * * * * * * * * ADDENDUM: * * * * * * * 

*







                                        I, as teaching physician, was present du

ring the entire procedure and/or







                                        during the key components.









                          Impressions               Performed At

 

                                         



                                        PACS/VR/DOSE



                          Status post right renal and retroperitoneal mass biops

y, respectively. 



                                        No



                                        



                                        perinephric or retroperitoneal hematoma.



                                        



                                         



                                        



                                        Preliminary Report Dictated by Resident:

 Herman Subramanian I, Ramon Desai MD., have reviewed this study an

d agree with the 



                                        above



                                        



                          report.                   









                          Narrative                 Performed At

 

                          EXAMINATION: ULTRASOUND-GUIDED PERCUTANEOUS RIGHT JACQUELINE

L AND PACS/VR/DOSE



                                        RETROPERITONEAL



                                        



                                        MASS BIOPSIES (BIOPSY X 2).



                                        



                                        EXAMINATION: CT ABDOMEN PELVIS WITHOUT C

ONTRAST.



                                        



                                         



                                        



                          HISTORY/INDICATION: 84 y.o. M with 2x large right retr

operitoneal 



                                        masses,



                                        



                          appearance of 1 mass originating from kidney but large

r more cephalad 



                                        mass



                                        



                                        unclear etiology, request biopsy of both

 masses 



                                        



                                         



                                        



                                        PROCEDURE



                                        



                                         



                                        



                                        SEDATION: Moderate sedation was administ

ered under the attending



                                        



                                        physician's direction and continuous mon

itoring by a trained nurse



                                        



                                        specialist who was independent from thos

e actually performing the



                                        



                                        procedure. Please see EMR for total saroj

tored sedation time.



                                        



                                         



                                        



                          TECHNIQUE: The risks, benefits and alternatives were d

iscussed and 



                                        informed



                                        



                          consent was obtained. Prior to beginning the procedure

, Universal 



                                        Protocol



                                        



                          was performed to confirm the patient's identity and th

e planned 



                                        procedure.



                                        



                          Maximum sterile barriers including cap, mask, hand hyg

iene, sterile 



                                        gloves,



                                        



                                        sterile gown, large sterile drape and cu

taneous antisepsis were used. 



                                        



                                         



                                        



                          The right kidney and retroperitoneal masses were local

ized using 



                                        ultrasound



                                        



                                        guidance. A safe route, free of overlyin

g structures was identified. The



                                        



                                        area over this site was anesthetized wit

h 1 percent lidocaine. 



                                        



                                         



                                        



                                        A 17 gauge coaxial needle was advanced i

nto the right kidney mass and



                                        



                                        retroperitoneal mass, respectively, usin

g real-time ultrasound guidance.



                                        



                                        Two 22-gauge FNA passes were performed f

or each of the biopsy sites. 18



                                        



                                        gauge biopsy devices were advanced into 

the right kidney mass and



                                        



                          retroperitoneal substances using real-time ultrasound 

guidance and a 



                                        series



                                        



                                        of 2 renal and 3 retroperitoneal specime

ns were obtained, respectively.



                                        



                                        These tissue cores were transferred to [facility] appropriate containers for



                                        



                                        surgical pathology.



                                        



                                         



                                        



                          At the end of the procedure, the biopsy devices were r

emoved and 



                                        pressure



                                        



                                        held until hemostasis was achieved.



                                        



                                         



                                        



                                        ESTIMATED BLOOD LOSS: Minimal.



                                        



                                         



                                        



                                        CONDITION: Stable.



                                        



                                         



                                        



                                        DISCHARGE TO: Patient care division.



                                        



                                         



                                        



                                        FINDINGS: Initial ultrasound images demo

nstrate right renal and



                                        



                          retroperitoneal masses. Final ultrasound images demons

trate no 



                                        significant



                                        



                                        perinephric hematoma.



                                        



                                         



                                        



                                        Successful image guided percutaneous rig

ht renal and retroperitoneal



                                        



                                        biopsies.



                                        



                                         



                                        



                                        CT ABDOMEN PELVIS



                                        



                                         



                                        



                                        COMPARISON: CT abdomen pelvis 2019.



                                        



                                         



                                        



                                        DOSE: 1389 mGycm



                                        



                                         



                                        



                                        TECHNIQUE AND FINDINGS: Axial imaging fr

om the level of the lung bases



                                        



                                        through the pubic symphysis was performe

d without the intravenous



                                        



                                        administration of contrast. Coronal and 

sagittal reconstructions were



                                        



                                        obtained. Auto mA and/or iterative rec

onstruction were used to reduce



                                        



                                        radiation dose.



                                        



                                         



                                        



                                        LOWER THORAX: Scattered bilateral lower 

lobe subsegmental/dependent



                                        



                          atelectasis and subpleural cystic changes, right great

er than left. 



                                        Trace



                                        



                                        right pleural effusion with adjacent com

pressive atelectasis. Moderate



                                        



                                        cardiomegaly.



                                        



                                         



                                        



                                        LIVER: No focal hepatic lesions. Preethi

l liver contour.



                                        



                                         



                                        



                          GALLBLADDER AND BILIARY TREE: No biliary ductal dilati

on. No 



                                        gallbladder



                                        



                                        wall thickening.



                                        



                                         



                                        



                                        SPLEEN: No splenomegaly. Punctate calcif

ied splenic granulomas.



                                        



                                         



                                        



                                        PANCREAS: No ductal dilation or masses.



                                        



                                         



                                        



                                        ADRENAL GLANDS: No adrenal nodules. Righ

t adrenal gland is anteriorly



                                        



                                        displaced as before.



                                        



                                         



                                        



                                        KIDNEYS/RETROPERITONEUM: Unchanged right

 renal and retroperitoneal



                                        



                                        lobulated masses, better evaluated on re

cent prior CT. Changes of recent



                                        



                                        percutaneous tissue sampling with foci o

f gas within the right superior



                                        



                                        renal pole, perinephric, and retroperito

zoey regions. No discrete



                                        



                                        surrounding hematoma.



                                        



                                         



                                        



                          Left renal hypodensities are also better evaluated on 

the recent prior 



                                        CT,



                                        



                                        but are unchanged.



                                        



                                         



                                        



                                        LYMPH NODES: No lymphadenopathy.



                                        



                                         



                                        



                                        GI TRACT: No dilation or wall thickening

.



                                        



                                         



                                        



                                        PELVIS/BLADDER: Mild asymmetric thickeni

ng of the mostly decompressed



                                        



                          urinary bladder which contains a Sam catheter and tr

ace intraluminal 



                                        gas.



                                        



                                         



                                        



                                        VESSELS: Moderate calcified atherosclero

tic plaque.



                                        



                                         



                                        



                                        BONES AND SOFT TISSUES: No suspicious ly

tic or sclerotic bony lesions.



                                        



                                        Transitional lumbosacral anatomy. Grade 

1 anterolisthesis of L4 on L5.



                                        



                                                    









                                        Procedure Note

 

                                        [facility], Radiant Results Inft User - 2020 11:25 PM CDT



EXAMINATION: ULTRASOUND-GUIDED PERCUTANEOUS RIGHT RENAL AND RETROPERITONEAL



                                        MASS BIOPSIES (BIOPSY X 2).



                                        EXAMINATION: CT ABDOMEN PELVIS WITHOUT C

ONTRAST.



                                        



                                        HISTORY/INDICATION: 84 y.o. M with 2x la

rge right retroperitoneal masses,



                                        appearance of 1 mass originating from ki

dney but larger more cephalad mass



                                        unclear etiology, request biopsy of both

 masses



                                        



                                        PROCEDURE



                                        



                                        SEDATION: Moderate sedation was administ

ered under the attending



                                        physician's direction and continuous mon

itoring by a trained nurse



                                        specialist who was independent from thos

e actually performing the



                                        procedure. Please see EMR for total saroj

tored sedation time.



                                        



                                        TECHNIQUE: The risks, benefits and alter

natives were discussed and informed



                                        consent was obtained. Prior to beginning

 the procedure, Universal Protocol



                                        was performed to confirm the patient's i

dentity and the planned procedure.



                                        Maximum sterile barriers including cap, 

mask, hand hygiene, sterile gloves,



                                        sterile gown, large sterile drape and cu

taneous antisepsis were used.



                                        



                                        The right kidney and retroperitoneal mas

ses were localized using ultrasound



                                        guidance. A safe route, free of overlyin

g structures was identified. The



                                        area over this site was anesthetized wit

h 1 percent lidocaine.



                                        



                                        A 17 gauge coaxial needle was advanced i

nto the right kidney mass and



                                        retroperitoneal mass, respectively, usin

g real-time ultrasound guidance.



                                        Two 22-gauge FNA passes were performed f

or each of the biopsy sites. 18



                                        gauge biopsy devices were advanced into 

the right kidney mass and



                                        retroperitoneal substances using real-ti

me ultrasound guidance and a series



                                        of 2 renal and 3 retroperitoneal specime

ns were obtained, respectively.



                                        These tissue cores were transferred to [facility] appropriate containers for



                                        surgical pathology.



                                        



                                        At the end of the procedure, the biopsy 

devices were removed and pressure



                                        held until hemostasis was achieved.



                                        



                                        ESTIMATED BLOOD LOSS: Minimal.



                                        



                                        CONDITION: Stable.



                                        



                                        DISCHARGE TO: Patient care division.



                                        



                                        FINDINGS: Initial ultrasound images demo

nstrate right renal and



                                        retroperitoneal masses. Final ultrasound

 images demonstrate no significant



                                        perinephric hematoma.



                                        



                                        Successful image guided percutaneous rig

ht renal and retroperitoneal



                                        biopsies.



                                        



                                        CT ABDOMEN PELVIS



                                        



                                        COMPARISON: CT abdomen pelvis 2019.



                                        



                                        DOSE: 1389 mGycm



                                        



                                        TECHNIQUE AND FINDINGS: Axial imaging fr

om the level of the lung bases



                                        through the pubic symphysis was performe

d without the intravenous



                                        administration of contrast. Coronal and 

sagittal reconstructions were



                                        obtained.  Auto mA and/or iterative sánchez

nstruction were used to reduce



                                        radiation dose.



                                        



                                        LOWER THORAX: Scattered bilateral lower 

lobe subsegmental/dependent



                                        atelectasis and subpleural cystic change

s, right greater than left. Trace



                                        right pleural effusion with adjacent com

pressive atelectasis. Moderate



                                        cardiomegaly.



                                        



                                        LIVER: No focal hepatic lesions.  Normal

 liver contour.



                                        



                                        GALLBLADDER AND BILIARY TREE: No biliary

 ductal dilation.  No gallbladder



                                        wall thickening.



                                        



                                        SPLEEN: No splenomegaly. Punctate calcif

ied splenic granulomas.



                                        



                                        PANCREAS: No ductal dilation or masses.



                                        



                                        ADRENAL GLANDS: No adrenal nodules. Righ

t adrenal gland is anteriorly



                                        displaced as before.



                                        



                                        KIDNEYS/RETROPERITONEUM: Unchanged right

 renal and retroperitoneal



                                        lobulated masses, better evaluated on re

cent prior CT. Changes of recent



                                        percutaneous tissue sampling with foci o

f gas within the right superior



                                        renal pole, perinephric, and retroperito

zoey regions. No discrete



                                        surrounding hematoma.



                                        



                                        Left renal hypodensities are also better

 evaluated on the recent prior CT,



                                        but are unchanged.



                                        



                                        LYMPH NODES: No lymphadenopathy.



                                        



                                        GI TRACT: No dilation or wall thickening

.



                                        



                                        PELVIS/BLADDER: Mild asymmetric thickeni

ng of the mostly decompressed



                                        urinary bladder which contains a Sam c

atheter and trace intraluminal gas.



                                        



                                        VESSELS: Moderate calcified atherosclero

tic plaque.



                                        



                                        BONES AND SOFT TISSUES: No suspicious ly

tic or sclerotic bony lesions.



                                        Transitional lumbosacral anatomy. Grade 

1 anterolisthesis of L4 on L5.



                                        



                                        IMPRESSION



                                        



                                        Status post right renal and retroperiton

eal mass biopsy, respectively. No



                                        perinephric or retroperitoneal hematoma.



                                        



                                        Preliminary Report Dictated by Resident:

 Herman Subramanian I, Ramon Desai MD., have reviewed 

this study and agree with the above



                                        report.









                Performing Organization Address         City/State/Zipcode Phone

 Number

 

                PACS/VR/DOSE                                    



IR BIOPSY RENAL PERCUTANEOUS WITH ULTRASOUND (2020 11:15 AM CDT)





                                        Specimen

 

                                        









                                        Addenda

 

                                        Addendum by Ramon Desai DO on 04/3

  2:06 PM







                                        * * * * * * * * ADDENDUM: * * * * * * * 

*







                                        I, as teaching physician, was present du

ring the entire procedure and/or







                                        during the key components.









                          Impressions               Performed At

 

                                         



                                        PACS/VR/DOSE



                          Status post right renal and retroperitoneal mass biops

y, respectively. 



                                        No



                                        



                                        perinephric or retroperitoneal hematoma.



                                        



                                         



                                        



                                        Preliminary Report Dictated by Resident:

 Herman Subramanian I, Ramon Desai MD., have reviewed this study an

d agree with the 



                                        above



                                        



                          report.                   









                          Narrative                 Performed At

 

                          EXAMINATION: ULTRASOUND-GUIDED PERCUTANEOUS RIGHT JACQUELINE

L AND PACS/VR/DOSE



                                        RETROPERITONEAL



                                        



                                        MASS BIOPSIES (BIOPSY X 2).



                                        



                                        EXAMINATION: CT ABDOMEN PELVIS WITHOUT C

ONTRAST.



                                        



                                         



                                        



                          HISTORY/INDICATION: 84 y.o. M with 2x large right retr

operitoneal 



                                        masses,



                                        



                          appearance of 1 mass originating from kidney but large

r more cephalad 



                                        mass



                                        



                                        unclear etiology, request biopsy of both

 masses 



                                        



                                         



                                        



                                        PROCEDURE



                                        



                                         



                                        



                                        SEDATION: Moderate sedation was administ

ered under the attending



                                        



                                        physician's direction and continuous mon

itoring by a trained nurse



                                        



                                        specialist who was independent from thos

e actually performing the



                                        



                                        procedure. Please see EMR for total saroj

tored sedation time.



                                        



                                         



                                        



                          TECHNIQUE: The risks, benefits and alternatives were d

iscussed and 



                                        informed



                                        



                          consent was obtained. Prior to beginning the procedure

, Universal 



                                        Protocol



                                        



                          was performed to confirm the patient's identity and th

e planned 



                                        procedure.



                                        



                          Maximum sterile barriers including cap, mask, hand hyg

iene, sterile 



                                        gloves,



                                        



                                        sterile gown, large sterile drape and cu

taneous antisepsis were used. 



                                        



                                         



                                        



                          The right kidney and retroperitoneal masses were local

ized using 



                                        ultrasound



                                        



                                        guidance. A safe route, free of overlyin

g structures was identified. The



                                        



                                        area over this site was anesthetized wit

h 1 percent lidocaine. 



                                        



                                         



                                        



                                        A 17 gauge coaxial needle was advanced i

nto the right kidney mass and



                                        



                                        retroperitoneal mass, respectively, usin

g real-time ultrasound guidance.



                                        



                                        Two 22-gauge FNA passes were performed f

or each of the biopsy sites. 18



                                        



                                        gauge biopsy devices were advanced into 

the right kidney mass and



                                        



                          retroperitoneal substances using real-time ultrasound 

guidance and a 



                                        series



                                        



                                        of 2 renal and 3 retroperitoneal specime

ns were obtained, respectively.



                                        



                                        These tissue cores were transferred to [facility] appropriate containers for



                                        



                                        surgical pathology.



                                        



                                         



                                        



                          At the end of the procedure, the biopsy devices were r

emoved and 



                                        pressure



                                        



                                        held until hemostasis was achieved.



                                        



                                         



                                        



                                        ESTIMATED BLOOD LOSS: Minimal.



                                        



                                         



                                        



                                        CONDITION: Stable.



                                        



                                         



                                        



                                        DISCHARGE TO: Patient care division.



                                        



                                         



                                        



                                        FINDINGS: Initial ultrasound images demo

nstrate right renal and



                                        



                          retroperitoneal masses. Final ultrasound images demons

trate no 



                                        significant



                                        



                                        perinephric hematoma.



                                        



                                         



                                        



                                        Successful image guided percutaneous rig

ht renal and retroperitoneal



                                        



                                        biopsies.



                                        



                                         



                                        



                                        CT ABDOMEN PELVIS



                                        



                                         



                                        



                                        COMPARISON: CT abdomen pelvis 2019.



                                        



                                         



                                        



                                        DOSE: 1389 mGycm



                                        



                                         



                                        



                                        TECHNIQUE AND FINDINGS: Axial imaging fr

om the level of the lung bases



                                        



                                        through the pubic symphysis was performe

d without the intravenous



                                        



                                        administration of contrast. Coronal and 

sagittal reconstructions were



                                        



                                        obtained. Auto mA and/or iterative rec

onstruction were used to reduce



                                        



                                        radiation dose.



                                        



                                         



                                        



                                        LOWER THORAX: Scattered bilateral lower 

lobe subsegmental/dependent



                                        



                          atelectasis and subpleural cystic changes, right great

er than left. 



                                        Trace



                                        



                                        right pleural effusion with adjacent com

pressive atelectasis. Moderate



                                        



                                        cardiomegaly.



                                        



                                         



                                        



                                        LIVER: No focal hepatic lesions. Preethi

l liver contour.



                                        



                                         



                                        



                          GALLBLADDER AND BILIARY TREE: No biliary ductal dilati

on. No 



                                        gallbladder



                                        



                                        wall thickening.



                                        



                                         



                                        



                                        SPLEEN: No splenomegaly. Punctate calcif

ied splenic granulomas.



                                        



                                         



                                        



                                        PANCREAS: No ductal dilation or masses.



                                        



                                         



                                        



                                        ADRENAL GLANDS: No adrenal nodules. Righ

t adrenal gland is anteriorly



                                        



                                        displaced as before.



                                        



                                         



                                        



                                        KIDNEYS/RETROPERITONEUM: Unchanged right

 renal and retroperitoneal



                                        



                                        lobulated masses, better evaluated on re

cent prior CT. Changes of recent



                                        



                                        percutaneous tissue sampling with foci o

f gas within the right superior



                                        



                                        renal pole, perinephric, and retroperito

zoey regions. No discrete



                                        



                                        surrounding hematoma.



                                        



                                         



                                        



                          Left renal hypodensities are also better evaluated on 

the recent prior 



                                        CT,



                                        



                                        but are unchanged.



                                        



                                         



                                        



                                        LYMPH NODES: No lymphadenopathy.



                                        



                                         



                                        



                                        GI TRACT: No dilation or wall thickening

.



                                        



                                         



                                        



                                        PELVIS/BLADDER: Mild asymmetric thickeni

ng of the mostly decompressed



                                        



                          urinary bladder which contains a Sam catheter and tr

ace intraluminal 



                                        gas.



                                        



                                         



                                        



                                        VESSELS: Moderate calcified atherosclero

tic plaque.



                                        



                                         



                                        



                                        BONES AND SOFT TISSUES: No suspicious ly

tic or sclerotic bony lesions.



                                        



                                        Transitional lumbosacral anatomy. Grade 

1 anterolisthesis of L4 on L5.



                                        



                                                    









                                        Procedure Note

 

                                        [facility], Radiant Results Inft User - 2020 11:25 PM CDT



EXAMINATION: ULTRASOUND-GUIDED PERCUTANEOUS RIGHT RENAL AND RETROPERITONEAL



                                        MASS BIOPSIES (BIOPSY X 2).



                                        EXAMINATION: CT ABDOMEN PELVIS WITHOUT C

ONTRAST.



                                        



                                        HISTORY/INDICATION: 84 y.o. M with 2x la

rge right retroperitoneal masses,



                                        appearance of 1 mass originating from ki

dney but larger more cephalad mass



                                        unclear etiology, request biopsy of both

 masses



                                        



                                        PROCEDURE



                                        



                                        SEDATION: Moderate sedation was administ

ered under the attending



                                        physician's direction and continuous mon

itoring by a trained nurse



                                        specialist who was independent from thos

e actually performing the



                                        procedure. Please see EMR for total saroj

tored sedation time.



                                        



                                        TECHNIQUE: The risks, benefits and alter

natives were discussed and informed



                                        consent was obtained. Prior to beginning

 the procedure, Universal Protocol



                                        was performed to confirm the patient's i

dentity and the planned procedure.



                                        Maximum sterile barriers including cap, 

mask, hand hygiene, sterile gloves,



                                        sterile gown, large sterile drape and cu

taneous antisepsis were used.



                                        



                                        The right kidney and retroperitoneal mas

ses were localized using ultrasound



                                        guidance. A safe route, free of overlyin

g structures was identified. The



                                        area over this site was anesthetized wit

h 1 percent lidocaine.



                                        



                                        A 17 gauge coaxial needle was advanced i

nto the right kidney mass and



                                        retroperitoneal mass, respectively, usin

g real-time ultrasound guidance.



                                        Two 22-gauge FNA passes were performed f

or each of the biopsy sites. 18



                                        gauge biopsy devices were advanced into 

the right kidney mass and



                                        retroperitoneal substances using real-ti

me ultrasound guidance and a series



                                        of 2 renal and 3 retroperitoneal specime

ns were obtained, respectively.



                                        These tissue cores were transferred to [facility] appropriate containers for



                                        surgical pathology.



                                        



                                        At the end of the procedure, the biopsy 

devices were removed and pressure



                                        held until hemostasis was achieved.



                                        



                                        ESTIMATED BLOOD LOSS: Minimal.



                                        



                                        CONDITION: Stable.



                                        



                                        DISCHARGE TO: Patient care division.



                                        



                                        FINDINGS: Initial ultrasound images demo

nstrate right renal and



                                        retroperitoneal masses. Final ultrasound

 images demonstrate no significant



                                        perinephric hematoma.



                                        



                                        Successful image guided percutaneous rig

ht renal and retroperitoneal



                                        biopsies.



                                        



                                        CT ABDOMEN PELVIS



                                        



                                        COMPARISON: CT abdomen pelvis 2019.



                                        



                                        DOSE: 1389 mGycm



                                        



                                        TECHNIQUE AND FINDINGS: Axial imaging fr

om the level of the lung bases



                                        through the pubic symphysis was performe

d without the intravenous



                                        administration of contrast. Coronal and 

sagittal reconstructions were



                                        obtained.  Auto mA and/or iterative sánchez

nstruction were used to reduce



                                        radiation dose.



                                        



                                        LOWER THORAX: Scattered bilateral lower 

lobe subsegmental/dependent



                                        atelectasis and subpleural cystic change

s, right greater than left. Trace



                                        right pleural effusion with adjacent com

pressive atelectasis. Moderate



                                        cardiomegaly.



                                        



                                        LIVER: No focal hepatic lesions.  Normal

 liver contour.



                                        



                                        GALLBLADDER AND BILIARY TREE: No biliary

 ductal dilation.  No gallbladder



                                        wall thickening.



                                        



                                        SPLEEN: No splenomegaly. Punctate calcif

ied splenic granulomas.



                                        



                                        PANCREAS: No ductal dilation or masses.



                                        



                                        ADRENAL GLANDS: No adrenal nodules. Righ

t adrenal gland is anteriorly



                                        displaced as before.



                                        



                                        KIDNEYS/RETROPERITONEUM: Unchanged right

 renal and retroperitoneal



                                        lobulated masses, better evaluated on re

cent prior CT. Changes of recent



                                        percutaneous tissue sampling with foci o

f gas within the right superior



                                        renal pole, perinephric, and retroperito

zoey regions. No discrete



                                        surrounding hematoma.



                                        



                                        Left renal hypodensities are also better

 evaluated on the recent prior CT,



                                        but are unchanged.



                                        



                                        LYMPH NODES: No lymphadenopathy.



                                        



                                        GI TRACT: No dilation or wall thickening

.



                                        



                                        PELVIS/BLADDER: Mild asymmetric thickeni

ng of the mostly decompressed



                                        urinary bladder which contains a Sam c

atheter and trace intraluminal gas.



                                        



                                        VESSELS: Moderate calcified atherosclero

tic plaque.



                                        



                                        BONES AND SOFT TISSUES: No suspicious ly

tic or sclerotic bony lesions.



                                        Transitional lumbosacral anatomy. Grade 

1 anterolisthesis of L4 on L5.



                                        



                                        IMPRESSION



                                        



                                        Status post right renal and retroperiton

eal mass biopsy, respectively. No



                                        perinephric or retroperitoneal hematoma.



                                        



                                        Preliminary Report Dictated by Resident:

 Herman Subramanian I, Ramon Desai MD., have reviewed 

this study and agree with the above



                                        report.









                Performing Organization Address         City/State/Zipcode Phone

 Number

 

                PACS/VR/DOSE                                    



CYTO [facility]GAN ASPIRATION-[facility] (2020 10:30 AM CDT)





             Component    Value        Ref Range    Performed At Pathologist



                                                                 Signature

 

                          Case Report               [facility]      

             

Case: [id number]                              

             [facility] 

LABORATORY                              



                                                    Authorizing Provider: Kvng Avendano MD Collected:      

2020 1026                           SERVICES            



                                                    Ordering Location:   Tra

nsplant/Gynecology/Onco Received:      

2020 1234                                               



                                                              

  logy (MARIANNE 9D)           

                                      

                       



                                                    Specimens:  A) - KIDNEY, R

IGHT, ULTRASOUND (US) IMAGE-GUIDED FINE NEEDLE 

ASPIRATION                                                



                                                           B) - RETR

OPERITONEUM, ULTRASOUND (US) IMAGE-GUIDED FINE 

NEEDLE ASPIRATION                                               



                                                                 

 

                          Final Diagnosis           A. KIDNEY, RIGHT, ULTRASOUND

 (US) IMAGE-GUIDED FINE NEEDLE 

ASPIRATION:                             [facility] LABORAT[facility]     Electronically



                          - MALIGNANT SPINDLE CELL NEOPLASM (SEE COMMENT)       

       SERVICES     signed by Alicia Schmitz MD on 2020 

at



                                        B. RETROPERITONEUM, MASS, ULTRASOUND (US

) IMAGE-GUIDED FINE NEEDLE ASPIRATION: 

                                                    5:32 PM



                          - MALIGNANT SPINDLE CELL NEOPLASM (SEE COMMENT)       

                    



                                                                 



                                                    I have personally reviewed a

ll specimens/slides and agree with all statements 

made by residents, fellows or pathologist assistants whose name(s) may appear on
this report.                                                

 

                          Final Diagnosis           A. Smear and cytospin from k

idney fine needle aspiration show 

predominantly blood. There are rare clusters of atypical spindle cells with 
hyperchromatic pleomorphic nuclei seen. Core biopsy sections grazyna                

           [facility] 

LABORATORY                              



                          Comment                   w spindly tumor cells with h

yperchromatic pleomorphic nuclei, irregular 

nuclear membrane, occasional rhabdoid appearance and intranuclear inclusions. 
Mitotic figures are seen.                     SERVICES            



                                                                 



                                                    Immunostains were performed 

on tumor cells within the block A2 and block A3 

show the following results:                                         



                          - A1/A3: rare cells positive                          

 



                          - CAM5.2: rare cells positive                         

  



                          - PAX-8: Negative                           



                          - CD34: rare cells patchy weak positive               

            



                          - S100: Negative                           



                          - SMA: Positive (strongly diffuse)                    

       



                          - Desmin: Negative                           



                          - Caldesmon: Negative                           



                          - Vimentin: Positive (strongly diffuse)               

            



                          - Myogenin: Negative                           



                          - HMB-45: Negative                           



                          - Melan-A: Negative                           



                          All controls show appropriate reactivity              

             



                                                                 



                                                                 



                                                    B. Smear and cytospin from r

etroperitoneum fine needle aspiration show blood 

only. Core biopsy sections show the similar malignant spindle cells 
infiltration. There are alternating hypercellular and hypocellular areas with 
myxoid stroma.                                              



                                                                 



                                                    The morphology and the immun

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 

cell neoplasm with high grade feature. The differential diagnoses include soft 
tissue sarcoma, primary renal sarcoma, and s                                    

     



                                                    arcomatoid carcinoma of kidn

ey. Clinical and radiological correlation is 

recommended.                                                



                                                                 



                                                    This case has been reviewed 

by  and Dr Mariano, who concur with the 

diagnosis.                                                  

 

             Clinical     right retro               [facility] 



             Information  peritoneal mass              SERVICES     

 

                          Gross Description         A1.  KIDNEY, RIGHT; ULTRASOU

ND (US) IMAGE GUIDED FINE NEEDLE 

ASPIRATION                              [facility]     



                                                    Received fresh is 0.4 cc's o

f sanguinous material obtained by aspiration (2 

needle pass(es))                        SERVICES            



                                                    Prepared 6 slides (2 Romanow

feng stained smear(s), 2 Papanicolaou stained 

smear(s), and 2 Papanicolaou stained cytospin preparation(s))                   

                      



                                                                 



                          A2.  KIDNEY, RIGHT; ULTRASOUND (US) IMAGE GUIDED; Core

 biopsy                           



                                                    1 18-gauge fragmented white 

/ red core biopsy ranging in length from 0.1 to 1 

cm placed in formalin on 2020 1045 hours.  Gross description performed by 
Gagan Palma.                                            



                                                    Prepared 8 slides (2 H&E and

 6 immunostain(s) (AE1/AE3, CK8, Pax8, CD34, S100, 

SMA)                                                        



                                                                 



                          A3.  KIDNEY, RIGHT; ULTRASOUND (US) IMAGE GUIDED; Core

 biopsy                           



                                                    1 18-gauge fragmented white 

/ red core biopsy ranging in length of to 0.5 cm 

placed in formalin on 2020 1045 hours.  Gross description performed by 
Gagan Palma.                                            



                                                    Prepared 8 slides (2 H&E and

 6 immunostain(s) (desmin, caldesmon, myogenin, 

HMB45, Melan-A, Vimentin)                                         



                                                                 



                          Total = 22 slides                           



                                                                 



                          B1.  RETROPERITONEUM; ULTRASOUND (US) IMAGE GUIDED FIN

E NEEDLE ASPIRATION                           



                                                    Received fresh is 0.4 cc's o

f sanguinous material obtained by aspiration (2 

needle pass(es))                                            



                                                    Prepared 6 slides (2 Romanow

feng stained smear(s), 2 Papanicolaou stained 

smear(s), and 2 Papanicolaou stained cytospin preparation(s))                   

                      



                                                                 



                          B2. RETROPERITONEUM; ULTRASOUND (US) IMAGE GUIDED; Cor

e biopsy                           



                                                    1 18-gauge fragmented white 

/ red core biopsy ranging in length from 0.2 to 2.1

cm placed in formalin on 2020 1106 hours.  Gross description performed by 
Gagan Palma.                                            



                          Prepared 2 slides (2 H&E)                           



                                                                 



                          B3.  RETROPERITONEUM; ULTRASOUND (US) IMAGE GUIDED; Co

re biopsy                           



                                                    1 18-gauge fragmented white 

/ red core biopsy ranging in length from 0.6 to 1.0

cm placed in formalin on 2020 1106 hours.  Gross description performed by 
Gagan Palma.                                            



                          Prepared 2 slides (2 H&E)                           



                                                                 



                          Total = 10 slides                           



                                                                 



                                                    Slides and collection tubes 

were labeled onsite with patient name and unique 

identification number after verification of patient identity.                   

                      

 

                          Cytology Rapid            A1.  KIDNEY, RIGHT; ULTRASOU

ND (US) IMAGE GUIDED FINE NEEDLE 

ASPIRATION                              [facility]     



             Assessment   Rapid on-site evaluation performed by Dr Schmitz           

   SERVICES     



                          Evaluation Episode # 1 (pass 1-2/2):  Blood only. Rare

 adipose cells.                           



                                                    (Preliminary rapid evaluatio

n; see final interpretation and comment given 

above)                                                      



                                                                 



                          B1.  RETROPERITONEUM; ULTRASOUND (US) IMAGE GUIDED FIN

E NEEDLE ASPIRATION                           



                          Rapid on-site evaluation performed by Dr Schmitz           

                



                          Evaluation Episode # 1 (pass 1-2/2): Blood only.      

                     



                                                    (Preliminary rapid evaluatio

n; see final interpretation and comment given 

above)                                                      

 

             Embedded Images                           [facility] 



                                                    SERVICES     









                                        Specimen

 

                                        Aspirate - RETROPERITONEUM

 

                                        KIDNEY, RIGHT









                Performing Organization Address         City/State/Zipcode Phone

 Number

 

                [facility] CLIA:  [id number], [address], [place] [address] [phone]



                                [facility]                 



CYTO ORGAN ASPIRATION-FNA (2020 10:26 AM CDT)





             Component    Value        Ref Range    Performed At Pathologist



                                                                 Signature

 

                          Case Report               [facility] Cytology      

             

Case: [id number]                              

             [facility] 

LAB[facility]                              



                                                    Authorizing Provider: Kvng Avendano MD Collected:      

2020 1026                           SERVICES            



                                                    Ordering Location:   Tra

nsplant/Gynecology/Onco Received:      

2020 1234                                               



                                                              

  logy (MARIANNE 9D)           

                                      

                       



                                                    Specimens:  A) - KIDNEY, R

IGHT, ULTRASOUND (US) IMAGE-GUIDED FINE NEEDLE 

ASPIRATION                                                



                                                           B) - RETR

OPERITONEUM, ULTRASOUND (US) IMAGE-GUIDED FINE 

NEEDLE ASPIRATION                                               



                                                                 

 

                          Final Diagnosis           A. KIDNEY, RIGHT, ULTRASOUND

 (US) IMAGE-GUIDED FINE NEEDLE 

ASPIRATION:                             [facility]     Electronically



                          - MALIGNANT SPINDLE CELL NEOPLASM (SEE COMMENT)       

       SERVICES     signed by Alicia Schmitz MD on 2020 

at



                                        B. RETROPERITONEUM, MASS, ULTRASOUND (US

) IMAGE-GUIDED FINE NEEDLE ASPIRATION: 

                                                    5:32 PM



                          - MALIGNANT SPINDLE CELL NEOPLASM (SEE COMMENT)       

                    



                                                                 



                                                    I have personally reviewed a

ll specimens/slides and agree with all statements 

made by residents, fellows or pathologist assistants whose name(s) may appear on
this report.                                                

 

                          Final Diagnosis           A. Smear and cytospin from k

idney fine needle aspiration show 

predominantly blood. There are rare clusters of atypical spindle cells with 
hyperchromatic pleomorphic nuclei seen. Core biopsy sections grazyna                

           [facility] 

LAB[facility]                              



                          Comment                   w spindly tumor cells with h

yperchromatic pleomorphic nuclei, irregular 

nuclear membrane, occasional rhabdoid appearance and intranuclear inclusions. 
Mitotic figures are seen.                     SERVICES            



                                                                 



                                                    Immunostains were performed 

on tumor cells within the block A2 and block A3 

show the following results:                                         



                          - A1/A3: rare cells positive                          

 



                          - CAM5.2: rare cells positive                         

  



                          - PAX-8: Negative                           



                          - CD34: rare cells patchy weak positive               

            



                          - S100: Negative                           



                          - SMA: Positive (strongly diffuse)                    

       



                          - Desmin: Negative                           



                          - Caldesmon: Negative                           



                          - Vimentin: Positive (strongly diffuse)               

            



                          - Myogenin: Negative                           



                          - HMB-45: Negative                           



                          - Melan-A: Negative                           



                          All controls show appropriate reactivity              

             



                                                                 



                                                                 



                                                    B. Smear and cytospin from r

etroperitoneum fine needle aspiration show blood 

only. Core biopsy sections show the similar malignant spindle cells 
infiltration. There are alternating hypercellular and hypocellular areas with 
myxoid stroma.                                              



                                                                 



                                                    The morphology and the immun

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 

cell neoplasm with high grade feature. The differential diagnoses include soft 
tissue sarcoma, primary renal sarcoma, and s                                    

     



                                                    arcomatoid carcinoma of kidn

ey. Clinical and radiological correlation is 

recommended.                                                



                                                                 



                                                    This case has been reviewed 

by  and Dr Mariano, who concur with the 

diagnosis.                                                  

 

             Clinical     right retro               [facility] 



             Information  peritoneal mass              SERVICES     

 

                          Gross Description         A1.  KIDNEY, RIGHT; ULTRASOU

ND (US) IMAGE GUIDED FINE NEEDLE 

ASPIRATION                              [facility]     



                                                    Received fresh is 0.4 cc's o

f sanguinous material obtained by aspiration (2 

needle pass(es))                        SERVICES            



                                                    Prepared 6 slides (2 Romanow

feng stained smear(s), 2 Papanicolaou stained 

smear(s), and 2 Papanicolaou stained cytospin preparation(s))                   

                      



                                                                 



                          A2.  KIDNEY, RIGHT; ULTRASOUND (US) IMAGE GUIDED; Core

 biopsy                           



                                                    1 18-gauge fragmented white 

/ red core biopsy ranging in length from 0.1 to 1 

cm placed in formalin on 2020 1045 hours.  Gross description performed by 
Gagan Palma.                                            



                                                    Prepared 8 slides (2 H&E and

 6 immunostain(s) (AE1/AE3, CK8, Pax8, CD34, S100, 

SMA)                                                        



                                                                 



                          A3.  KIDNEY, RIGHT; ULTRASOUND (US) IMAGE GUIDED; Core

 biopsy                           



                                                    1 18-gauge fragmented white 

/ red core biopsy ranging in length of to 0.5 cm 

placed in formalin on 2020 1045 hours.  Gross description performed by 
Gagan Palma.                                            



                                                    Prepared 8 slides (2 H&E and

 6 immunostain(s) (desmin, caldesmon, myogenin, 

HMB45, Melan-A, Vimentin)                                         



                                                                 



                          Total = 22 slides                           



                                                                 



                          B1.  RETROPERITONEUM; ULTRASOUND (US) IMAGE GUIDED FIN

E NEEDLE ASPIRATION                           



                                                    Received fresh is 0.4 cc's o

f sanguinous material obtained by aspiration (2 

needle pass(es))                                            



                                                    Prepared 6 slides (2 Romanow

feng stained smear(s), 2 Papanicolaou stained 

smear(s), and 2 Papanicolaou stained cytospin preparation(s))                   

                      



                                                                 



                          B2. RETROPERITONEUM; ULTRASOUND (US) IMAGE GUIDED; Cor

e biopsy                           



                                                    1 18-gauge fragmented white 

/ red core biopsy ranging in length from 0.2 to 2.1

cm placed in formalin on 2020 1106 hours.  Gross description performed by 
Gagan Palma.                                            



                          Prepared 2 slides (2 H&E)                           



                                                                 



                          B3.  RETROPERITONEUM; ULTRASOUND (US) IMAGE GUIDED; Co

re biopsy                           



                                                    1 18-gauge fragmented white 

/ red core biopsy ranging in length from 0.6 to 1.0

cm placed in formalin on 2020 1106 hours.  Gross description performed by 
Gagan Palma.                                            



                          Prepared 2 slides (2 H&E)                           



                                                                 



                          Total = 10 slides                           



                                                                 



                                                    Slides and collection tubes 

were labeled onsite with patient name and unique 

identification number after verification of patient identity.                   

                      

 

                          Cytology Rapid            A1.  KIDNEY, RIGHT; ULTRASOU

ND (US) IMAGE GUIDED FINE NEEDLE 

ASPIRATION                              [facility]     



             Assessment   Rapid on-site evaluation performed by Dr Schmitz           

   SERVICES     



                          Evaluation Episode # 1 (pass 1-2/2):  Blood only. Rare

 adipose cells.                           



                                                    (Preliminary rapid evaluatio

n; see final interpretation and comment given 

above)                                                      



                                                                 



                          B1.  RETROPERITONEUM; ULTRASOUND (US) IMAGE GUIDED FIN

E NEEDLE ASPIRATION                           



                          Rapid on-site evaluation performed by Dr Schmitz           

                



                          Evaluation Episode # 1 (pass 1-2/2): Blood only.      

                     



                                                    (Preliminary rapid evaluatio

n; see final interpretation and comment given 

above)                                                      

 

             Embedded Images                           [facility] 



                                                    SERVICES     









                                        Specimen

 

                                        Aspirate - KIDNEY, RIGHT

 

                                        RETROPERITONEUM









                Performing Organization Address         City/[facility]/Zipcode Phone

 Number

 

                [facility] CLIA:  [id number], [address] [place] [address]

[phone]



                                [facility]                 



PROTHROMBIN TIME / INR (2020  1:16 AM CDT)





             Component    Value        Ref Range    Performed At Pathologist



                                                                 Signature

 

             PROTIME PATIENT 12.6         10.1 - 12.6  [facility] 



                                       Seconds      SERVICES     

 

             INR          1.1Comment: Normal              [facility] 



                          INR <1.1; Warfarin              SERVICES     



                          Therapeutic range                           



                          2.0 to 3.0 or 2.5                           



                          to 3.5, depending                           



                          upon the                               



                          indications.                           









                                        Specimen

 

                                        Blood - ARM, LEFT









                Performing Organization Address         City/[facility] Phone

 Number

 

                [facility] CLIA:  [id number], [address] [place] [address]

[phone]



                                [facility]                 



BASIC METABOLIC PANEL (NA, K, CL, CO2, GLUCOSE, BUN, CREATININE, CA) (2020
 1:16 AM CDT)





             Component    Value        Ref Range    Performed At Pathologist Sig

nature

 

             NA           132 (L)      135 - 145    [facility] 



                                       mmol/L       SERVICES     

 

             K            3.1 (L)      3.5 - 5.0    [facility] 



                                       mmol/L       SERVICES     

 

             CL           98           98 - 108 mmol/L [facility] 



                                                    SERVICES     

 

             CO2 TOTAL    29           23 - 31 mmol/L [facility] 



                                                    SERVICES     

 

             AGAP         5            2 - 16       [facility] 



                                                    SERVICES     

 

             BUN          27 (H)       7 - 23 mg/dL [facility] 



                                                    SERVICES     

 

             GLUCOSE      100          70 - 110 mg/dL [facility] 



                                                    SERVICES     

 

             CREATININE   1.11         0.60 - 1.25  [facility] 



                                       mg/dL        SERVICES     

 

             CALCIUM      7.5 (L)      8.6 - 10.6   [facility] 



                                       mg/dL        SERVICES     

 

             eGFR Calculation 63.3         mL/min/1.73m2 [facility] 



             (Non-                           SERVICES     



             American)                                           

 

             eGFR Calculation 76.7         mL/min/1.73m2 [facility] 



             (African American)                           SERVICES     









                                        Specimen

 

                                        Blood - ARM, LEFT









                          Narrative                 Performed At

 

                          Association of Glomerular Filtration Rate (GFR) and St

aging [facility] 

SERVICES



                                        of Kidney Disease*



                                        



                                         



                                        



                          +-----------------------+---------------------+-------

------ 



                                        ------------+



                                        



                          | GFR (mL/min/1.73 m2) | With Kidney Damage | Wi

thout 



                                        Kidney Damage



                                        



                          +-----------------------+---------------------+-------

------ 



                                        ------------+



                                        



                          | >90         | Stage one   

  



                                        |  Normal        



                                        



                          +-----------------------+---------------------+-------

------ 



                                        ------------+



                                        



                          | 60-89        | Stage two   

  



                                        |  Decreased GFR     



                                        



                          +-----------------------+---------------------+-------

------ 



                                        ------------+



                                        



                          | 30-59        | Stage three  

  



                                        |  Stage three      



                                        



                          +-----------------------+---------------------+-------

------ 



                                        ------------+



                                        



                          | 15-29        | Stage four   

  



                                        |  Stage four      



                                        



                          +-----------------------+---------------------+-------

------ 



                                        ------------+



                                        



                          | <15 (or dialysis)  | Stage five     

|  



                                        Stage five      



                                        



                          +-----------------------+---------------------+-------

------ 



                                        ------------+



                                        



                                         



                                        



                          *Each stage assumes the associated GFR level has been 

in 



                          effect for at least three months. Stages 1 to 5, wit

h or 



                                        without kidney disease, indicate chronic

 kidney disease.



                                        



                                         



                                        



                          Notes: Determination of stages one and two (with eGFR 



                          >59mL/min/1.73 m2) requires estimation of kidney damag

e for 



                          at least three months as defined by structural or func

tional 



                                        abnormalities of the kidney, manifested 

by either:



                                        



                          Pathological abnormalities or Markers of kidney damage

 



                          (including abnormalities in the composition of the blo

od or 



                                        urine or abnormalities in imaging tests)

. 



                                        



                                                    









                Performing Organization Address         City/State/Zipcode Phone

 Number

 

                [facility] CLIA:  [id number], [address] [address]

[phone]



                                [facility]                 



CBC WITH DIFFERENTIAL (2020  1:16 AM CDT)





             Component    Value        Ref Range    Performed At Pathologist Sig

nature

 

             WBC          7.93         4.20 - 10.70 [facility] 



                                       10*3/L     SERVICES     

 

             RBC          3.30 (L)     4.26 - 5.52  [facility] 



                                       10*6/L     SERVICES     

 

             HGB          9.9 (L)      12.2 - 16.4  [facility] 



                                       g/dL         SERVICES     

 

             HCT          30.0 (L)     38.4 - 49.3 % [facility] 



                                                    SERVICES     

 

             MCV          90.9         81.7 - 95.6 fL [facility] 



                                                    SERVICES     

 

             MCH          30.0         26.1 - 32.7 pg [facility] 



                                                    SERVICES     

 

             MCHC         33.0         31.2 - 35.0  [facility] 



                                       g/dL         SERVICES     

 

             RDW-SD       49.4         38.5 - 51.6 fL [facility] 



                                                    SERVICES     

 

             RDW-CV       14.6         12.1 - 15.4 % [facility] 



                                                    SERVICES     

 

             PLT          229          150 - 328    [facility] 



                                       10*3/L     SERVICES     

 

             MPV          9.9          9.8 - 13.0 fL [facility] 



                                                    SERVICES     

 

             NRBC/100 WBC 0.0          0.0 - 10.0 /100 [facility] 



                                       WBCs         SERVICES     

 

             NRBC x10^3   <0.01        10*3/L     [facility] 



                                                    SERVICES     

 

             GRAN MAT (NEUT) % 65.0         %            [facility] 



                                                    SERVICES     

 

             IMM GRAN %   0.90         %            [facility] 



                                                    SERVICES     

 

             LYMPH %      19.3         %            [facility] 



                                                    SERVICES     

 

             MONO %       9.6          %            [facility] 



                                                    SERVICES     

 

             EOS %        4.8          %            [facility] 



                                                    SERVICES     

 

             BASO %       0.4          %            [facility] 



                                                    SERVICES     

 

             GRAN MAT x10^3(ANC) 5.16         1.99 - 6.95  [facility] 



                                       10*3/uL      SERVICES     

 

             IMM GRAN x10^3 0.07 (H)     0.00 - 0.06  [facility] 



                                       10*3/uL      SERVICES     

 

             LYMPH x10^3  1.53         1.09 - 3.23  [facility] 



                                       10*3/uL      SERVICES     

 

             MONO x10^3   0.76         0.36 - 1.02  [facility] 



                                       10*3/uL      SERVICES     

 

             EOS x10^3    0.38         0.06 - 0.53  [facility] 



                                       10*3/uL      SERVICES     

 

             BASO x10^3   0.03         0.01 - 0.09  [facility] 



                                       10*3/uL      SERVICES     









                                        Specimen

 

                                        Blood - ARM, LEFT









                Performing Organization Address         City/State/Zipcode Phone

 Number

 

                [facility] CLIA:  [id number], [address], [place] [address] [phone]



                                [facility]                 



ALDOSTERONE, SERUM (2020 12:31 PM CDT)





             Component    Value        Ref Range    Performed At Pathologist Sig

nature

 

             ALDOST       <3.0         ng/dL        [facility]         



                          Comment:                               



                          RIGHT ARM                              



                          INTERPRETIVE INFORMATION: Aldosterone, Serum          

                 



                                                                 



                          Reference intervals for age 15 and older:             

              



                           Upright ......... 4.0 - 31.0 ng/dL                 

          



                           Supine .......... Less than or equal to 16.0 ng/dL 

                          



                           Unspecified ..... Less than or equal to 31.0 ng/dL 

                          



                                                                 



                          Normal serum levels of aldosterone are dependent on th

e sodium                           



                          intake and whether the patient is upright or supine. H

igh sodium                           



                          intake will tend to suppress serum aldosterone, wherea

s low sodium                           



                          intake will elevate serum aldosterone. The reference i

ntervals for                           



                          serum aldosterone are based on normal sodium intake.  

                         



                                                                 



                          Access complete set of age- and/or gender-specific ref

erence                           



                          intervals for this test in the [facility]

rectory                           



                          ([email or web address]).                           



                          Performed by [facility],                       

    



                          [address], [place] [id number] [phone]            

               



                          [email or web address], Reuben Sullivan MD, Lab. Director     

                      









                                        Specimen

 

                                        Blood - ARM, RIGHT









                [facility] Organization Address         City/State/Zipcode Phone

 Number

 

                [facility]            [address] [place] [address] 



RENIN ACTIVITY (2020 12:31 PM CDT)





             Component    Value        Ref Range    Performed At Pathologist Sig

nature

 

             RENIN        0.7          ng/mL/hr     [facility]         



                          Comment:                               



                          INTERPRETIVE INFORMATION: Renin Activity              

             



                                                                 



                          Adult, Normal sodium diet:                           



                           Supine ................. 0.2-1.6 ng/mL/hr          

                 



                           Upright ................ 0.5-4.0 ng/mL/hr          

                 



                                                                 



                          Children, Normal sodium diet, Supine:                 

          



                            (1-7 days) ..... 2.0-35.0 ng/mL/hr         

                  



                           Cord blood ............. 4.0-32.0 ng/mL/hr         

                  



                           1-12 mos ............... 2.4-37.0 ng/mL/hr         

                  



                           13 mos-3 yrs ........... 1.7-11.2 ng/mL/hr         

                  



                           4-5 yrs ................ 1.0- 6.5 ng/mL/hr         

                  



                           6-10 yrs ............... 0.5- 5.9 ng/mL/hr         

                  



                           11-15 yrs .............. 0.5- 3.3 ng/mL/hr         

                  



                                                                 



                          Children, normal sodium diet, Upright:                

           



                           0-3 yrs ................ Not Available             

              



                           4-5 yrs ................ Less than or equal to 15 n

g/mL/hr                           



                           6-10 yrs ............... Less than or equal to 17 n

g/mL/hr                           



                           11-15 yrs .............. Less than or equal to 16 n

g/mL/hr                           



                                                                 



                          Plasma renin activity measures enzyme ability to conve

rt                           



                          angiotensinogen to angiotensin I and is limited by the

                           



                          availability of angiotensinogen. Plasma renin activity

 is not an                           



                          accurate indicator of enzyme activity when angiotensin

ogen is                           



                          decreased.                             



                                                                 



                          See Compliance Statement D: www.[employer]/        

                   



                          Performed by [facility],                       

    



                          [address] [place] [address],[place] [id number] [phone]            

               



                          [email or web address], Reuben Sullivan MD, Lab. Director     

                      









                                        Specimen

 

                                        Blood - ARM, RIGHT









                Performing Organization Address         City/State/Zipcode Phone

 Number

 

                [facility]            [address] [place] [address] 



CATECHOLAMINES, PLASMA FRACT (2020 10:24 AM CDT)





             Component    Value        Ref Range    Performed At Pathologist Sig

nature

 

             EPINEPH      25           10 - 200 pg/mL [facility]         

 

             NOREPINEPH   361          80 - 520 pg/mL [facility]         

 

             DOPAMINE     <20          0 - 20 pg/mL [facility]         

 

             INFO         See Note                  [facility]         



                          Comment:                               



                          INTERPRETIVE INFORMATION: Catecholamines Panel, Plasma

                           



                                                                 



                          Small increases in catecholamines (less than 2 times t

he upper                           



                          reference limit) usually are the result of physiologic

al stimuli,                           



                          drugs, or improper specimen collection. Significant el

evation of                           



                          one or more catecholamines (2 or more times the uppe

r reference                           



                          limit) is associated with an increased probability of 

a                           



                          neuroendocrine tumor. Measurement of plasma or urine f

ractionated                           



                          metanephrines provides better diagnostic sensitivity

 than                           



                          measurement of catecholamines.                        

   



                                                                 



                          Higher catecholamine concentrations are observed in sp

ecimens                           



                          collected from upright or standing adults. Epinephri

ne may be                           



                          increased by approximately 20 percent; norepinephrine 

up to 700                           



                          pg/mL; dopamine, unchanged.                           



                          Performed by [facility],                       

    



                          [address] [facility],[place] [id number] [phone]            

               



                          [email or web address], Reuben Sullivan MD, Lab. Director     

                      









                                        Specimen

 

                                        Blood - HAND, LEFT









                Performing Organization Address         City/State/Zipcode Phone

 Number

 

                [facility]            [address] [place] [address] 



BASIC METABOLIC PANEL (NA, K, CL, CO2, GLUCOSE, BUN, CREATININE, CA) (2020
 3:24 AM CDT)





             Component    Value        Ref Range    Performed At [facility] [place] [facility]

nature

 

             NA           132 (L)      135 - 145    [facility] 



                                       mmol/L       SERVICES     

 

             K            3.2 (L)      3.5 - 5.0    [facility] 



                                       mmol/L       SERVICES     

 

             CL           98           98 - 108 mmol/L [facility] 



                                                    SERVICES     

 

             CO2 TOTAL    27           23 - 31 mmol/L [facility] 



                                                    SERVICES     

 

             AGAP         7            2 - 16       [facility] 



                                                    SERVICES     

 

             BUN          26 (H)       7 - 23 mg/dL [facility] 



                                                    SERVICES     

 

             GLUCOSE      97           70 - 110 mg/dL [facility] 



                                                    SERVICES     

 

             CREATININE   1.15         0.60 - 1.25  [facility] 



                                       mg/dL        SERVICES     

 

             CALCIUM      7.7 (L)      8.6 - 10.6   [facility] 



                                       mg/dL        SERVICES     

 

             eGFR Calculation 60.7         mL/min/1.73m2 [facility] 



             (Non-                           SERVICES     



             American)                                           

 

             eGFR Calculation 73.6         mL/min/1.73m2 [facility] 



             (African American)                           SERVICES     









                                        Specimen

 

                                        Blood - HAND, RIGHT









                          Narrative                 Performed At

 

                          [facility] Glomerular Filtration Rate (GFR) and St

aging [facility] 

SERVICES



                                        of Kidney Disease*



                                        



                                         



                                        



                          +-----------------------+---------------------+-------

------ 



                                        ------------+



                                        



                          | GFR (mL/min/1.73 m2) | With Kidney Damage | Wi

thout 



                                        Kidney Damage



                                        



                          +-----------------------+---------------------+-------

------ 



                                        ------------+



                                        



                          | >90         | Stage one   

  



                                        |  Normal        



                                        



                          +-----------------------+---------------------+-------

------ 



                                        ------------+



                                        



                          | 60-89        | Stage two   

  



                                        |  Decreased GFR     



                                        



                          +-----------------------+---------------------+-------

------ 



                                        ------------+



                                        



                          | 30-59        | Stage three  

  



                                        |  Stage three      



                                        



                          +-----------------------+---------------------+-------

------ 



                                        ------------+



                                        



                          | 15-29        | Stage four   

  



                                        |  Stage four      



                                        



                          +-----------------------+---------------------+-------

------ 



                                        ------------+



                                        



                          | <15 (or dialysis)  | Stage five     

|  



                                        Stage five      



                                        



                          +-----------------------+---------------------+-------

------ 



                                        ------------+



                                        



                                         



                                        



                          *Each stage assumes the associated GFR level has been 

in 



                          effect for at least three months. Stages 1 to 5, wit

h or 



                                        without kidney disease, indicate chronic

 kidney disease.



                                        



                                         



                                        



                          Notes: Determination of stages one and two (with eGFR 



                          >59mL/min/1.73 m2) requires estimation of kidney damag

e for 



                          at least three months as defined by structural or func

tional 



                                        abnormalities of the kidney, manifested 

by either:



                                        



                          Pathological abnormalities or Markers of kidney damage

 



                          (including abnormalities in the composition of the blo

od or 



                                        urine or abnormalities in imaging tests)

. 



                                        



                                                    









                Performing Organization Address         City/State/Zipcode Phone

 Number

 

                [facility] CLIA:  [id number], [address], [place] [address]

[address] [phone]



                                [facility]                 



CT ABDOMEN PELVIS W WO CONTRAST (2020 11:59 PM CDT)





                                        Specimen

 

                                        









                          Impressions               Performed At

 

                                         



                                        PACS/VR/DOSE



                          1. Large lobulated exophytic mass off the upper pole

 of the right 



                                        kidney



                                        



                          measures up to 12.8 cm and is concerning for renal romel

l carcinoma. The 



                                        mass



                                        



                                        abuts the right adrenal gland and result

s in anterior displacement. The



                                        



                                        mass abuts the right hemidiaphragm and t

he posterior abdominal wall with



                                        



                          suspected focal invasion (7:31 and 605:107). No vascul

ar invasion, 



                                        matt,



                                        



                                        or bone metastasis detected.



                                        



                                         



                                        



                          2. A heterogeneously enhancing mass at the interpola

r left kidney 



                                        measures



                                        



                          1.9 cm and is indeterminate but also concerning for re

nal cell 



                                        carcinoma.



                                        



                          Hyperdensity within the bladder likely represents bloo

d. Sam catheter 



                                        is



                                        



                                        seen in the urinary bladder along with f

ocal iatrogenic air. 



                                        



                                         



                                        



                          3. Small right-sided pleural effusion and right lowe

r lobe 



                                        atelectasis.



                                        



                                         



                                        



                                         



                                        



                                        Preliminary Report Dictated by Resident:

 Isaias Davis I, Carolina Mcgrath MD., have reviewe

d this study and agree with the



                                        



                          above report.             









                          Narrative                 Performed At

 

                                        EXAM: CT ABDOMEN AND PELVIS WITH AND WIT

HOUT CONTRAST



                                        PACS/VR/DOSE



                                         



                                        



                                        HISTORY: Hematuria.



                                        



                                         



                                        



                                        COMPARISON: None.



                                        



                                         



                                        



                          TECHNIQUE AND FINDINGS: Contiguous axial imaging from 

the level of the 



                                        lung



                                        



                                        bases through the pubic symphysis was pe

rformed before and after the



                                        



                          uncomplicated administration of intravenous Omnipaque 

contrast. Coronal 



                                        and



                                        



                                        sagittal reconstructions were obtained.



                                        



                                         



                                        



                                        FINDINGS:



                                        



                                         



                                        



                          LOWER THORAX: Mild elevation of the right hemidiaphrag

m. Right lower 



                                        lobe



                                        



                                        enhancing consolidation is most consiste

nt with atelectasis. Trace



                                        



                                        bilateral pleural effusions. Cardiomegal

y with four-chamber enlargement.



                                        



                                         



                                        



                                        LIVER: No focal hepatic lesions. Normal 

liver contour.



                                        



                                         



                                        



                          GALLBLADDER AND BILIARY TREE: No biliary ductal dilati

on. No 



                                        gallbladder



                                        



                                        wall thickening.



                                        



                                         



                                        



                                        SPLEEN: No splenomegaly.



                                        



                                         



                                        



                                        PANCREAS: No ductal dilation or masses.



                                        



                                         



                                        



                          ADRENAL GLANDS: No adrenal nodules. The right adrenal 

gland is 



                                        anteriorly



                                        



                                        displaced by the large renal mass withou

t definitive invasion.



                                        



                                         



                                        



                          KIDNEYS: A large heterogeneously enhancing lobulated m

ass off the 



                                        superior



                                        



                          pole of the right kidney measures approximately 12.5 x

 10.3 x 12.8 cm 



                                        (AP



                                        



                          by cc by TV). The mass results in mass effect and disp

lacement of the 



                                        right



                                        



                          hepatic lobe. It also abuts the superior aspect of the

 right adrenal 



                                        gland



                                        



                                        (5:31) and results in anterior displacem

ent. The mass abuts the right



                                        



                                        hemidiaphragm. No vascular invasion dete

cted.



                                        



                                         



                                        



                                        A heterogeneously enhancing round lesion

 at the interpolar left kidney



                                        



                                        (5:50) measures 1.8 x 1.9 cm.



                                        



                                         



                                        



                                        Multiple bilateral simple renal cysts.



                                        



                                         



                                        



                                        PERITONEUM AND RETROPERITONEUM: No free 

air or fluid.



                                        



                                         



                                        



                                        LYMPH NODES: No lymphadenopathy.



                                        



                                         



                                        



                                        GI TRACT: No dilation or wall thickening

.



                                        



                                         



                                        



                                        PELVIS/BLADDER: The bladder contains a F

oley catheter. Gas within the



                                        



                          bladder is likely due to Sam placement. Hyperdensity

 within the 



                                        bladder



                                        



                                        likely represents blood products..



                                        



                                         



                                        



                                        VESSELS: Unremarkable.



                                        



                                         



                                        



                                        BONES AND SOFT TISSUES: No suspicious ly

tic or sclerotic bony lesions.



                                        



                                         



                                        



                                                    









                                        Procedure Note

 

                                        [facility], Radiant Results Inft User - 2020  8:59 AM CDT



EXAM: CT ABDOMEN AND PELVIS WITH AND WITHOUT CONTRAST



                                        



                                        HISTORY: Hematuria.



                                        



                                        COMPARISON: None.



                                        



                                        TECHNIQUE AND FINDINGS: Contiguous axial

 imaging from the level of the lung



                                        bases through the pubic symphysis was pe

rformed before and after the



                                        uncomplicated administration of intraven

ous Omnipaque contrast. Coronal and



                                        sagittal reconstructions were obtained.



                                        



                                        FINDINGS:



                                        



                                        LOWER THORAX: Mild elevation of the righ

t hemidiaphragm. Right lower lobe



                                        enhancing consolidation is most consiste

nt with atelectasis. Trace



                                        bilateral pleural effusions. Cardiomegal

y with four-chamber enlargement.



                                        



                                        LIVER: No focal hepatic lesions. Normal 

liver contour.



                                        



                                        GALLBLADDER AND BILIARY TREE: No biliary

 ductal dilation.  No gallbladder



                                        wall thickening.



                                        



                                        SPLEEN: No splenomegaly.



                                        



                                        PANCREAS: No ductal dilation or masses.



                                        



                                        ADRENAL GLANDS: No adrenal nodules. The 

right adrenal gland is anteriorly



                                        displaced by the large renal mass withou

t definitive invasion.



                                        



                                        KIDNEYS: A large heterogeneously enhanci

ng lobulated mass off the superior



                                        pole of the right kidney measures approx

imately 12.5 x 10.3 x 12.8 cm (AP



                                        by cc by TV). The mass results in mass e

ffect and displacement of the right



                                        hepatic lobe. It also abuts the superior

 aspect of the right adrenal gland



                                        (5:31) and results in anterior displacem

ent. The mass abuts the right



                                        hemidiaphragm. No vascular invasion dete

cted.



                                        



                                        A heterogeneously enhancing round lesion

 at the interpolar left kidney



                                        (5:50) measures 1.8 x 1.9 cm.



                                        



                                        Multiple bilateral simple renal cysts.



                                        



                                        PERITONEUM AND RETROPERITONEUM: No free 

air or fluid.



                                        



                                        LYMPH NODES: No lymphadenopathy.



                                        



                                        GI TRACT: No dilation or wall thickening

.



                                        



                                        PELVIS/BLADDER: The bladder contains a F

oley catheter. Gas within the



                                        bladder is likely due to Sam placement

. Hyperdensity within the bladder



                                        likely represents blood products..



                                        



                                        VESSELS: Unremarkable.



                                        



                                        BONES AND SOFT TISSUES: No suspicious ly

tic or sclerotic bony lesions.



                                        



                                        



                                        IMPRESSION



                                        



                                        1.  Large lobulated exophytic mass off t

he upper pole of the right kidney



                                        measures up to 12.8 cm and is concerning

 for renal cell carcinoma. The mass



                                        abuts the right adrenal gland and result

s in anterior displacement. The



                                        mass abuts the right hemidiaphragm and t

he posterior abdominal wall with



                                        suspected focal invasion (7:31 and 605:1

07). No vascular invasion, matt,



                                        or bone metastasis detected.



                                        



                                        2.  A heterogeneously enhancing mass at 

the interpolar left kidney measures



                                        1.9 cm and is indeterminate but also con

cerning for renal cell carcinoma.



                                        Hyperdensity within the bladder likely r

epresents blood. Sam catheter is



                                        seen in the urinary bladder along with f

ocal iatrogenic air.



                                        



                                        3.  Small right-sided pleural effusion a

nd right lower lobe atelectasis.



                                        



                                        



                                        Preliminary Report Dictated by Resident:

 Isaias Davis I, Carolina Mcgrath MD., have reviewed

 this study and agree with the



                                        above report.









                Performing Organization Address         City/State/Zipcode Phone

 Number

 

                PACS/VR/DOSE                                    



CORONAVIRUS COVID-19 TESTING (2020  5:51 PM CDT)





             Component    Value        Ref Range    Performed At Pathologist Sig

nature

 

             SARS-CoV-2   Not Detected Not Detected [facility] 









                                        Specimen

 

                                        Swab - NASOPHARYNGEAL SWAB









                          Narrative                 Performed At

 

                          ID NOW COVID-19 Assay is an isothermal nucleic acid [facility]



                          amplification test intended for the qualitative detect

ion of 



                          nucleic acid from SARS-CoV-2 viral RNA in nasopharynge

al 



                          (NP) specimens. It is used under Emergency Use Authori

zation 



                          (EUA) by FDA. The limit of detection (LOD) of the assa

y is 



                                        125 Genome Equivalents/mL.



                                        



                                         



                                        



                          A positive result is indicative of the presence of 



                          SARS-CoV-2 RNA. Clinical correlation with patient hi

story 



                          and other diagnostic information is necessary to deter

mine 



                                        patient infection status.



                                        



                                         



                                        



                          A negative (Not Detected) result does not preclude 



                          SARS-CoV-2 infection. Clinical correlation with patien

t 



                          history and other diagnostic information should be use

d in 



                                        patient management decisions. 



                                        



                                         



                                        



                          Invalid: Please collect a new specimen for repeat toni

ent 



                          testing if clinically indicated. 









                Performing Organization Address         City/State/Zipcode Phone

 Number

 

                [facility] CLIA:  [id number], [address] [place] [address]

[phone]



                                [facility]                 



URINE CULTURE (2020  5:04 PM CDT)





             Component    Value        Ref Range    Performed At Pathologist Sig

nature

 

             URINE CULTURE No aerobic growth              [facility] 



                          (< 1000 CFU/mL)              SERVICES     









                                        Specimen

 

                                        Urine - URINE, CLEAN CATCH









                Performing Organization Address         City/State/Zipcode Phone

 Number

 

                [facility] CLIA:  [id number], [address] [place] [address]

[phone]



                                [facility]                 



COMP. METABOLIC PANEL (33023) (2020  5:04 PM CDT)





             Component    Value        Ref Range    Performed At Pathologist



                                                                 Signature

 

             NA           133 (L)      135 - 145    [facility] 



                                       mmol/L       SERVICES     

 

             K            3.3 (L)      3.5 - 5.0    [facility] 



                                       mmol/L       SERVICES     

 

             CL           97 (L)       98 - 108 mmol/L [facility] 



                                                    SERVICES     

 

             CO2 TOTAL    31           23 - 31 mmol/L [facility] 



                                                    SERVICES     

 

             AGAP         5            2 - 16       [facility] 



                                                    SERVICES     

 

             BUN          32 (H)       7 - 23 mg/dL [facility] 



                                                    SERVICES     

 

             GLUCOSE      108          70 - 110 mg/dL [facility] 



                                                    SERVICES     

 

             CREATININE   1.49 (H)     0.60 - 1.25  [facility] 



                                       mg/dL        SERVICES     

 

             TOTAL BILI   0.4          0.1 - 1.1 mg/dL [facility] 



                                                    SERVICES     

 

             CALCIUM      7.9 (L)      8.6 - 10.6   [facility] 



                                       mg/dL        SERVICES     

 

             T PROTEIN    6.3          6.3 - 8.2 g/dL [facility] 



                                                    SERVICES     

 

             ALBUMIN      3.2 (L)      3.5 - 5.0 g/dL [facility] 



                                                    SERVICES     

 

             ALK PHOS     56           34 - 122 U/L [facility] 



                                                    SERVICES     

 

             ALTv         44           5 - 50 U/L   [facility] 



                                                    SERVICES     

 

             AST(SGOT)    102 (H)      13 - 40 U/L  [facility] 



                                                    SERVICES     

 

             eGFR Calculation 45.0         mL/min/1.73m2 [facility] 



             (Non-                           SERVICES     



             American)                                           

 

             eGFR Calculation 54.6         mL/min/1.73m2 [facility] 



             (African American)                           SERVICES     









                                        Specimen

 

                                        Blood - ARM, RIGHT









                          Narrative                 Performed At

 

                          Association of Glomerular Filtration Rate (GFR) and St

aging [facility] 

SERVICES



                                        of Kidney Disease*



                                        



                                         



                                        



                          +-----------------------+---------------------+-------

------ 



                                        ------------+



                                        



                          | GFR (mL/min/1.73 m2) | With Kidney Damage | Wi

thout 



                                        Kidney Damage



                                        



                          +-----------------------+---------------------+-------

------ 



                                        ------------+



                                        



                          | >90         | Stage one   

  



                                        |  Normal        



                                        



                          +-----------------------+---------------------+-------

------ 



                                        ------------+



                                        



                          | 60-89        | Stage two   

  



                                        |  Decreased GFR     



                                        



                          +-----------------------+---------------------+-------

------ 



                                        ------------+



                                        



                          | 30-59        | Stage three  

  



                                        |  Stage three      



                                        



                          +-----------------------+---------------------+-------

------ 



                                        ------------+



                                        



                          | 15-29        | Stage four   

  



                                        |  Stage four      



                                        



                          +-----------------------+---------------------+-------

------ 



                                        ------------+



                                        



                          | <15 (or dialysis)  | Stage five     

|  



                                        Stage five      



                                        



                          +-----------------------+---------------------+-------

------ 



                                        ------------+



                                        



                                         



                                        



                          *Each stage assumes the associated GFR level has been 

in 



                          effect for at least three months. Stages 1 to 5, wit

h or 



                                        without kidney disease, indicate chronic

 kidney disease.



                                        



                                         



                                        



                          Notes: Determination of stages one and two (with eGFR 



                          >59mL/min/1.73 m2) requires estimation of kidney damag

e for 



                          at least three months as defined by structural or func

tional 



                                        abnormalities of the kidney, manifested 

by either:



                                        



                          Pathological abnormalities or Markers of kidney damage

 



                          (including abnormalities in the composition of the blo

od or 



                                        urine or abnormalities in imaging tests)

. 



                                        



                                                    









                Performing Organization Address         City/State/Zipcode Phone

 Number

 

                [facility] CLIA:  [id number], [address] [address]

555 [phone]



                                [facility]                 



CBC WITH DIFFERENTIAL (2020  5:04 PM CDT)





             Component    Value        Ref Range    Performed At Pathologist Sig

nature

 

             WBC          9.54         4.20 - 10.70 [facility] 



                                       10*3/L     SERVICES     

 

             RBC          3.41 (L)     4.26 - 5.52  [facility] 



                                       10*6/L     SERVICES     

 

             HGB          10.2 (L)     12.2 - 16.4  [facility] 



                                       g/dL         SERVICES     

 

             HCT          31.3 (L)     38.4 - 49.3 % [facility] 



                                                    SERVICES     

 

             MCV          91.8         81.7 - 95.6 fL [facility] 



                                                    SERVICES     

 

             MCH          29.9         26.1 - 32.7 pg [facility] 



                                                    SERVICES     

 

             MCHC         32.6         31.2 - 35.0  [facility] 



                                       g/dL         SERVICES     

 

             RDW-SD       49.7         38.5 - 51.6 fL [facility] 



                                                    SERVICES     

 

             RDW-CV       14.7         12.1 - 15.4 % [facility] 



                                                    SERVICES     

 

             PLT          213          150 - 328    [facility] 



                                       10*3/L     SERVICES     

 

             MPV          10.1         9.8 - 13.0 fL [facility] 



                                                    SERVICES     

 

             NRBC/100 WBC 0.0          0.0 - 10.0 /100 [facility] 



                                       WBCs         SERVICES     

 

             NRBC x10^3   <0.01        10*3/L     [facility] 



                                                    SERVICES     

 

             GRAN MAT (NEUT) % 80.5         %            [facility] 



                                                    SERVICES     

 

             IMM GRAN %   0.50         %            [facility] 



                                                    SERVICES     

 

             LYMPH %      10.0         %            [facility] 



                                                    SERVICES     

 

             MONO %       8.1          %            [facility] 



                                                    SERVICES     

 

             EOS %        0.7          %            [facility] 



                                                    SERVICES     

 

             BASO %       0.2          %            [facility] 



                                                    SERVICES     

 

             GRAN MAT x10^3(ANC) 7.68 (H)     1.99 - 6.95  [facility] 



                                       10*3/uL      SERVICES     

 

             IMM GRAN x10^3 0.05         0.00 - 0.06  [facility] 



                                       10*3/uL      SERVICES     

 

             LYMPH x10^3  0.95 (L)     1.09 - 3.23  [facility] 



                                       10*3/uL      SERVICES     

 

             MONO x10^3   0.77         0.36 - 1.02  [facility] 



                                       10*3/uL      SERVICES     

 

             EOS x10^3    0.07         0.06 - 0.53  [facility] 



                                       10*3/uL      SERVICES     

 

             BASO x10^3   <0.03        0.01 - 0.09  [facility] 



                                       10*3/uL      SERVICES     









                                        Specimen

 

                                        Blood - ARM, RIGHT









                Performing Organization Address         City/[facility]/Zipcode Phone

 Number

 

                [facility] CLIA:  [id number], [address] [place] [address]

555 [phone]



                                [facility]                 



Fibrinogen (2020  5:04 PM CDT)





             Component    Value        Ref Range    Performed At Pathologist Sig

nature

 

             Fibrinogen   408          167 - 453 mg/dL [facility] 









                                        Specimen

 

                                        Blood - ARM, RIGHT









                Performing Organization Address         City/[facility]/Zipcode Phone

 Number

 

                [facility] CLIA:  [id number], [address] [place] [address]

555 [phone]



                                [facility]                 



aPTT (2020  5:04 PM CDT)





             Component    Value        Ref Range    Performed At Pathologist Sig

nature

 

             APTT Patient 29           26 - 36 Seconds [facility] 









                                        Specimen

 

                                        Blood - ARM, RIGHT









                Performing Organization Address         City[facility]

 Number

 

                [facility] CLIA:  [id number], [address] [place] [address]

555 [phone]



                                [facility]                 



Prothrombin Time / INR (2020  5:04 PM CDT)





             Component    Value        Ref Range    Performed At Pathologist



                                                                 Signature

 

             PROTIME PATIENT 12.3         10.1 - 12.6  [facility] 



                                       Seconds      SERVICES     

 

             INR          1.1Comment: Normal              [facility] 



                          INR <1.1; Warfarin              SERVICES     



                          Therapeutic range                           



                          2.0 to 3.0 or 2.5                           



                          to 3.5, depending                           



                          upon the                               



                          indications.                           









                                        Specimen

 

                                        Blood - ARM, RIGHT









                Performing Organization Address         City/[facility]/Zipcode Phone

 Number

 

                [facility] CLIA:  [id number], [address] [place] [address]

555 [phone]



                                [facility]                 



Phosphorus - Serum (2020  5:04 PM CDT)





             Component    Value        Ref Range    Performed At Pathologist Sig

nature

 

             PHOSPHORUS   2.6          2.5 - 5.0 mg/dL [facility] 









                                        Specimen

 

                                        Blood - ARM, RIGHT









                Performing Organization Address         City/[facility] Phone

 Number

 

                [facility] CLIA:  [id number], [address] [place] [address]

555 [phone]



                                [facility]                 



Magnesium - Serum (2020  5:04 PM CDT)





             Component    Value        Ref Range    Performed At Pathologist Sig

nature

 

             MAGNESIUM    2.2          1.7 - 2.4 mg/dL [facility] 









                                        Specimen

 

                                        Blood - ARM, RIGHT









                Performing Organization Address         City/[facility] Phone

 Number

 

                [facility] CLIA:  [id number], [address] [place] [address]

555 [phone]



                                [facility]                 



documented in this encounter



Visit Diagnoses







                                        Diagnosis

 

                                        Hematuria, unspecified type - Primary

 

                                        Pain of lower extremity, unspecified lat

erality

 

                                        Right renal mass







                                        Unspecified disorder of kidney and urete

r

 

                                        Gross hematuria

 

                                        History of DVT (deep vein thrombosis)







                                        Personal history of venous thrombosis an

d embolism



documented in this encounter



Administered Medications







           Medication Order MAR Action Action Date Dose       Rate       Site









                                        acetaminophen (TYLENOL) tablet 650 mg



                                        



                                        650 mg, Oral, Q6HPRN, Starting Mon  at 1723, Until Discontinued, Routine,

                                        



                          Pain (scale 1-3)          

 

                                                    









           D5W 0.45% NaCl (1/2NS) IV New Bag    2020  5:42 PM CDT 1,000 mL

   42 mL/hr   



                                        infusion 1,000 mL



                                                                 



           at 42 mL/hr, 1,000 mL, IV                                            

 



           Infusion, CONTINUOUS, Starting                                       

      



           Wed 20 at 1645, Until                                           

  



           Discontinued, Routine                                             









                                                    









                                        sennosides (SENOKOT) tablet 8.6 mg



             Given        2020  7:58 AM CDT 8.6 mg                    



           8.6 mg, Oral, DAILY, First dose on 20 at 0900, Until Discontinued, Routine                         

                    









             Given        2020  8:43 AM CDT 8.6 mg                    

 

             Given        2020  7:54 AM CDT 8.6 mg                    









                                                    









                                        









           Medication Order MAR Action Action Date Dose       Rate       Site

 

           D5W 0.45% NaCl (1/2NS) IV New Bag    2020  1:02 AM CDT 1,000 mL

   115 mL/hr  



                                        infusion 1,000 mL



                                                                 



           at 115 mL/hr, 1,000 mL, IV                                           

  



           Infusion, CONTINUOUS,                                             



           Starting 20 at                                             



           0000, Until 20 at                                           

  



           1642, Routine                                             









                                                    









                                        FENTanyl PF (SUBLIMAZE (PF)) injection



             Given        2020 10:29 AM CDT 25 mcg                    



           Slow IV Push, PRN, Starting 20 at                           

                  



           1029, Until 20 at 1029, Routine                             

                









                                                    









           iohexol (OMNIPAQUE 350 BULK-150 mL) Given      2020 11:44 PM CD

T 120 mL                



                                        injection 120 mL



                                                                 



           120 mL, Intravenous, ONCE, 1 dose, 20 at 0000, Routine                                             









                                                    









           lidocaine variable + sod bicarb Given by Provider 2020 11:00 AM

 20 mL                 



                                        injection



                          CDT                                    



           Intradermal, ONCE, 1 dose, 20 at 1100, 20 mL                                             









                                                    









                                        midazolam (VERSED) injection



             Given        2020 10:28 AM CDT 0.5 mg                    



           IV Push, PRN, Starting 20 at 1028,                          

                   



           Until 20 at 1028, Routine                                   

          









                                                    



documented in this encounter



Insurance







          Payer     Benefit Plan / Subscriber ID Effective Dates Phone     Addre

ss   Type



                    Group                                             

 

          MEDICARE  MEDICARE PART xxxxxxxxxxx 10/1/2001-Omar [phone] [address] 

Medicare



                      [address]                 [address]         [address]          [id number]



                                        



                                                            [place] 



                                                            [id number] 









           Guarantor Name Account Type Relation to Date of    Phone      Billing



                                 Patient    Birth                 Address

 

           Vance Hernandez Personal/Family Self       1935 [phone] 20

[address]







                                                       ([place])     [place] [address]



documented as of this encounter

## 2020-05-10 NOTE — EKG
Test Date:    2020-05-09               Test Time:    16:56:57

Technician:   MAGO                                    

                                                     

MEASUREMENT RESULTS:                                       

Intervals:                                           

Rate:         83                                     

OK:                                                  

QRSD:         84                                     

QT:           372                                    

QTc:          437                                    

Axis:                                                

P:                                                   

OK:                                                  

QRS:          187                                    

T:            37                                     

                                                     

INTERPRETIVE STATEMENTS:                                       

                                                     

Atrial fibrillation

Right superior axis deviation

Abnormal ECG

Compared to ECG 04/24/2020 15:00:15

Right superior axis now present

Left-axis deviation no longer present



Electronically Signed On 05-10-20 08:39:00 CDT by Horacio Brewer

## 2020-06-13 ENCOUNTER — HOSPITAL ENCOUNTER (EMERGENCY)
Dept: HOSPITAL 97 - ER | Age: 85
LOS: 1 days | Discharge: HOME | End: 2020-06-14
Payer: COMMERCIAL

## 2020-06-13 DIAGNOSIS — E87.6: Primary | ICD-10-CM

## 2020-06-13 DIAGNOSIS — Z88.2: ICD-10-CM

## 2020-06-13 DIAGNOSIS — Z86.73: ICD-10-CM

## 2020-06-13 DIAGNOSIS — Z79.82: ICD-10-CM

## 2020-06-13 DIAGNOSIS — I48.20: ICD-10-CM

## 2020-06-13 LAB
BUN BLD-MCNC: 15 MG/DL (ref 7–18)
GLUCOSE SERPLBLD-MCNC: 88 MG/DL (ref 74–106)
HCT VFR BLD CALC: 28.6 % (ref 39.6–49)
INR BLD: 1.07
LYMPHOCYTES # SPEC AUTO: 1.6 K/UL (ref 0.7–4.9)
PMV BLD: 7.5 FL (ref 7.6–11.3)
POTASSIUM SERPL-SCNC: 2.9 MMOL/L (ref 3.5–5.1)
RBC # BLD: 3.3 M/UL (ref 4.33–5.43)

## 2020-06-13 PROCEDURE — 73060 X-RAY EXAM OF HUMERUS: CPT

## 2020-06-13 PROCEDURE — 96365 THER/PROPH/DIAG IV INF INIT: CPT

## 2020-06-13 PROCEDURE — 85730 THROMBOPLASTIN TIME PARTIAL: CPT

## 2020-06-13 PROCEDURE — 85025 COMPLETE CBC W/AUTO DIFF WBC: CPT

## 2020-06-13 PROCEDURE — 80048 BASIC METABOLIC PNL TOTAL CA: CPT

## 2020-06-13 PROCEDURE — 36415 COLL VENOUS BLD VENIPUNCTURE: CPT

## 2020-06-13 PROCEDURE — 99284 EMERGENCY DEPT VISIT MOD MDM: CPT

## 2020-06-13 PROCEDURE — 85610 PROTHROMBIN TIME: CPT

## 2020-06-13 PROCEDURE — 93005 ELECTROCARDIOGRAM TRACING: CPT

## 2020-06-13 PROCEDURE — 73090 X-RAY EXAM OF FOREARM: CPT

## 2020-06-14 VITALS — OXYGEN SATURATION: 96 % | SYSTOLIC BLOOD PRESSURE: 144 MMHG | TEMPERATURE: 98.2 F | DIASTOLIC BLOOD PRESSURE: 100 MMHG

## 2020-06-14 NOTE — EDPHYS
Physician Documentation                                                                           

 [facility]                                                                 

Name: Brett Hernandez                                                                                 

Age: 84 yrs                                                                                       

Sex: Male                                                                                         

: 1935                                                                                   

MRN: [id number]                                                                                   

Arrival Date: 2020                                                                          

Time: 21:26                                                                                       

Account#: [id number]                                                                            

Bed 6                                                                                             

Private MD:                                                                                       

ED Physician Julio César Jasso                                                                     

HPI:                                                                                              

                                                                                             

21:55 This 84 yrs old  Male presents to ER via EMS with complaints of right arm      cp  

      bruise.                                                                                     

21:55 The patient or guardian complains of bruising.                                          cp  

21:55 The complaints affect the medial aspect right upper arm and right forearm. Context:     cp  

      resulted from unknown cause. Onset: The symptoms/episode began/occurred today.              

      Associated signs and symptoms: Pertinent positives: swelling, Pertinent negatives:          

      decreased range of motion, erythema, fever, pain. Wife reports patient takes daily          

      aspirin and no blood thinner medications.                                                   

                                                                                                  

Historical:                                                                                       

- Allergies:                                                                                      

21:35 Sulfa (Sulfonamide Antibiotics);                                                        rr5 

- Home Meds:                                                                                      

21:35 aspirin 81 mg Oral chew 1 tab once daily [Active]; multivitamin Oral daily [Active];    rr5 

      b12 [Active]; Prilosec Oral [Active]; vitamin B complex Oral [Active];                      

- PMHx:                                                                                           

21:35 cardiomegaly; CVA; Neoplasm of R kidney;                                                rr5 

                                                                                                  

- Immunization history:: Adult Immunizations unknown.                                             

- Social history:: Smoking status: unknown.                                                       

                                                                                                  

                                                                                                  

ROS:                                                                                              

22:00 MS/extremity: Positive for ecchymosis, of the medial aspect right upper arm and right   cp  

      forearm, Negative for injury or acute deformity, decreased range of motion, pain,           

      paresthesias.                                                                               

22:00 Constitutional: Negative for fever.                                                     cp  

22:00 Cardiovascular: Negative for chest pain.                                                    

22:00 Respiratory: Negative for shortness of breath, wheezing.                                    

22:00 Skin: Negative for cellulitis, rash.                                                        

22:00 Neuro: Negative for altered mental status, dizziness, headache, weakness.                   

22:00 All other systems are negative.                                                             

                                                                                                  

Exam:                                                                                             

22:10 Constitutional: The patient appears in no acute distress, alert, awake, comfortable,    cp  

      non-diaphoretic, non-toxic, well developed, well nourished.                                 

22:10 Head/Face:  Normocephalic, atraumatic.                                                  cp  

22:10 Eyes: Periorbital structures: appear normal, Conjunctiva: normal, no exudate, no            

      injection, Sclera: no appreciated abnormality, Lids and lashes: appear normal,              

      bilaterally.                                                                                

22:10 ENT: External ear(s): are unremarkable, Nose: is normal, Mouth: is normal, Posterior        

      pharynx: Airway: no evidence of obstruction, patent.                                        

22:10 Neck: ROM/movement: is normal, is supple, without pain, no range of motions limitations.    

22:10 Chest/axilla: Inspection: normal, Palpation: is normal, no crepitus, no tenderness.         

22:10 Cardiovascular: Rate: normal, Rhythm: irregularly irregular, Pulses: Pulses are 2+ in       

      right radial artery and left radial artery. Edema: is not appreciated, JVD: is not          

      appreciated.                                                                                

22:10 Respiratory: the patient does not display signs of respiratory distress,  Respirations:     

      normal, no use of accessory muscles, no retractions, labored breathing, is not present,     

      Breath sounds: are clear throughout, no decreased breath sounds.                            

22:10 Abdomen/GI: Exam negative for discomfort, distension, guarding, Inspection: abdomen         

      appears normal.                                                                             

22:10 Back: pain, is absent, ROM is normal.                                                       

22:10 Musculoskeletal/extremity: Extremities: grossly normal except: noted in the medial          

      aspect right upper arm and forearm: ecchymosis, swelling, There is no evidence of           

      decreased ROM, deformity, Perfusion: the extremity is normally perfused throughout,         

      Sensation intact.                                                                           

22:10 Skin: cellulitis, is not appreciated, intact of right arm.                                  

                                                                                             

00:10 ECG was reviewed by the Attending Physician.                                            cp  

                                                                                                  

Vital Signs:                                                                                      

                                                                                             

21:28  / 100 LA Supine (auto/pedi); Pulse 90 MON; Resp 16 S; Temp 98.2(O); Pulse Ox 96% ds4 

      on R/A;                                                                                     

22:35  / 99; Pulse 87; Resp 17; Pulse Ox 99% on R/A; Pain 0/10;                         rr5 

                                                                                             

00:00  / 70; Pulse 80; Resp 17; Pulse Ox 98% on R/A;                                    rr5 

01:00  / 93; Pulse 85; Resp 19; Pulse Ox 98% ;                                          rr5 

01:46  / 75; Pulse 79; Resp 17; Temp 98; Pulse Ox 100% ;                                rr5 

                                                                                                  

MDM:                                                                                              

                                                                                             

21:28 Patient medically screened.                                                             tw4 

23:00 Differential diagnosis: closed fracture, contusion, spontaneous bleed, DVT.             cp  

                                                                                             

01:17 Refusal of service: The patient/guardian displays adequate decision making capability   cp  

      and despite a detailed discussion of alternatives, benefits, risks, and consequences        

      refuses: IV potassium.                                                                      

01:25 Data reviewed: vital signs, nurses notes, lab test result(s), EKG, radiologic studies,  cp  

      plain films, I have discussed the patient's presentation/case with the attending            

      Emergency Department Physician; and as a result, I will discharge patient.                  

01:25 Test interpretation: by ED physician or midlevel provider: xrays of right humerus       cp  

      negative for fracture and xrays of right forearm negative for fracture. Counseling: I       

      had a detailed discussion with the patient and/or guardian regarding: the historical        

      points, exam findings, and any diagnostic results supporting the discharge/admit            

      diagnosis, lab results, radiology results, the need for outpatient follow up, an            

      internist, to return to the emergency department if symptoms worsen or persist or if        

      there are any questions or concerns that arise at home.                                     

                                                                                                  

                                                                                             

22:40 Order name: CBC with Diff; Complete Time: 23:35                                         cp  

                                                                                             

23:35 Interpretation: Normal except: RBC 3.30; HGB 9.8; HCT 28.6; MCV 86.7; MPV 7.5.          cp  

                                                                                             

22:40 Order name: PT-INR; Complete Time: 23:35                                                cp  

                                                                                             

21:57 Order name: XRAY Humerus RIGHT                                                          cp  

                                                                                             

21:57 Order name: XRAY Forearm RIGHT                                                          cp  

                                                                                             

22:40 Order name: Ptt, Activated; Complete Time: 23:35                                        cp  

                                                                                             

22:40 Order name: BMP; Complete Time: 23:38                                                   cp  

                                                                                             

23:38 Interpretation: Normal except: ; K 2.9; CL 95; GFR 71.                            cp  

                                                                                             

23:38 Order name: EKG; Complete Time: 23:39                                                   cp  

                                                                                             

23:38 Order name: EKG - Nurse/Tech; Complete Time: 00:07                                      cp  

                                                                                                  

EC:10 Rate is 88 beats/min. Rhythm is irregularly irregular, A fib. QRS interval is normal.   cp  

      QT interval is normal. Interpreted by me. Reviewed by me.                                   

                                                                                                  

Administered Medications:                                                                         

00:07 Drug: Potassium Chloride 20 mEq Route: IV; Rate: calculated rate; Site: left forearm;   rr5 

01:15 Follow up: Response: No adverse reaction; IV Status: Order to discontinue infusion; IV  rr5 

      Intake: 25ml ; discontinuedby the patient                                                   

00:29 Drug: Potassium Effervescent Tablet 50 mEq Route: PO;                                   rr5 

00:59 Follow up: Response: No adverse reaction                                                ea  

01:30 CANCELLED (Patient Refused): Potassium Effervescent Tablet 50 mEq PO once; dissolve in  ea  

      4 ounces of water or juice                                                                  

                                                                                                  

                                                                                                  

Disposition:                                                                                      

02:00 Chart complete.                                                                         cp  

06:24 Co-signature as Attending Physician, Julio César Jasso MD I agree with the assessment and 4 

      plan of care.                                                                               

                                                                                                  

Disposition:                                                                                      

20 01:25 Discharged to Home. Impression: Hypokalemia, Chronic atrial fibrillation.          

- Condition is Stable.                                                                            

- Discharge Instructions: Atrial Fibrillation, Potassium Content of Foods, Aspirin and            

  Your Heart, Hypokalemia.                                                                        

                                                                                                  

- Medication Reconciliation Form, Thank You Letter, Antibiotic Education, Prescription            

  Opioid Use, SBAR form form.                                                                     

- Follow up: Private Physician; When: 1 - 2 days; Reason: Recheck today's complaints.             

- Problem is new.                                                                                 

- Symptoms have improved.                                                                         

                                                                                                  

                                                                                                  

                                                                                                  

Signatures:                                                                                       

Dispatcher [employer]                           EDMS                                                 

Sujit Mcgrath PA PA cp Wadley, Terrence, MD MD   tw4                                                  

Pankaj Hernández RN                      RN   rr5                                                  

Daylin Shah RN, ea                                                   

                                                                                                  

Corrections: (The following items were deleted from the chart)                                    

01:30 01:17 Potassium Effervescent Tablet 50 mEq PO once; dissolve in 4 ounces of water or    ea  

      juice ordered. cp                                                                           

01:57 01:25 2020 01:25 Discharged to Home. Impression: Hypokalemia; Chronic atrial      rr5 

      fibrillation. Condition is Stable. Forms are Medication Reconciliation Form, Thank You      

      Letter, Antibiotic Education, Prescription Opioid Use. Follow up: Private Physician;        

      When: 1 - 2 days; Reason: Recheck today's complaints. Problem is new. Symptoms have         

      improved. cp                                                                                

                                                                                                  

**************************************************************************************************

## 2020-06-14 NOTE — RAD REPORT
EXAM DESCRIPTION:  RAD - Forearm Right - 6/13/2020 10:38 pm

 

CLINICAL HISTORY:  ecchymosisarm bruising, pain

 

COMPARISON:  No comparisonsNone.

 

FINDINGS:  No fracture is identified. There is no dislocation or periosteal reaction noted.

Two small round calcific densities are present in the midforearm anterior soft tissues. There is an a
ssociated oval soft tissue mass approximately 15 mm in size. This is nonspecific. This could be part 
of an old traumatic injury. A small fibroma or other benign mass would be possible. None are consider
ed to be long-term significant findings. Soft tissues anterior to the proximal forearm are mildly pro
minent. Soft tissue detail is limited. Hematoma is possible if there is trauma history or anticoagula
tion history. No history penetrating wound to suspect foreign body.

 

IMPRESSION:  No acute bone or joint finding.

 

Small approximately 15 mm oval mass with 2 benign round calcifications. Finding is nonspecific. Signi
ficance is doubtful.

 

Prominent anterior soft tissues could indicate hematoma. Correlation is needed with proximal forearm 
bruising history.

## 2020-06-14 NOTE — RAD REPORT
EXAM DESCRIPTION:  RAD - Humerus Right - 6/13/2020 10:38 pm

 

CLINICAL HISTORY:  ecchymosis, arm pain

 

COMPARISON:  No comparisonsNone.

 

FINDINGS:  No fracture is identified. There is no dislocation or periosteal reaction noted. Mild for 
age degenerative changes are present at the glenohumeral joint articular surfaces. AC joint degenerat
yony changes are present.

 

No foreign body in the soft tissues. No clearly defined soft tissue mass. Soft tissues are mildly pro
minent near the insertion of the deltoid musculature. Contusion, edema or small hematoma are possible
. Correlation is needed with location of ecchymosis or any palpable mass.

 

IMPRESSION:  Degenerative change with no acute bone or joint finding.

 

Mildly prominent soft tissues in the region of the deltoid muscle insertion. Contusion, edema and hem
atoma are possible. Correlation is needed with exam findings. No

## 2020-06-14 NOTE — ER
Nurse's Notes                                                                                     

 [facility]                                                                 

Name: Brett Hernandez                                                                                 

Age: 84 yrs                                                                                       

Sex: Male                                                                                         

: 1935                                                                                   

MRN: [id number]                                                                                   

Arrival Date: 2020                                                                          

Time: 21:26                                                                                       

Account#: [id number]                                                                            

Bed 6                                                                                             

Private MD:                                                                                       

Diagnosis: Hypokalemia;Chronic atrial fibrillation                                                

                                                                                                  

Presentation:                                                                                     

                                                                                             

21:40 Chief complaint: EMS states: Wife reports pt had a bruise on his right arm, reports she ea  

      noticed the bruise get larger today. Coronavirus screen: Proceed with normal triage.        

      [place] Screen: No symptoms or risks identified at this time. Initial Sepsis Screen: Does     

      the patient meet any 2 criteria? No. Patient's initial sepsis screen is negative. Does      

      the patient have a suspected source of infection? No. Patient's initial sepsis screen       

      is negative. Risk Assessment: Do you want to hurt yourself or someone else? Patient         

      reports no desire to harm self or others. Onset of symptoms was 2020.              

21:40 Method Of Arrival: EMS: [facility]                                                ea  

21:40 Acuity: VITO 3                                                                           ea  

                                                                                                  

Historical:                                                                                       

- Allergies:                                                                                      

21:35 Sulfa (Sulfonamide Antibiotics);                                                        rr5 

- Home Meds:                                                                                      

21:35 aspirin 81 mg Oral chew 1 tab once daily [Active]; multivitamin Oral daily [Active];    rr5 

      b12 [Active]; Prilosec Oral [Active]; vitamin B complex Oral [Active];                      

- PMHx:                                                                                           

21:35 cardiomegaly; CVA; Neoplasm of R kidney;                                                rr5 

                                                                                                  

- Immunization history:: Adult Immunizations unknown.                                             

- Social history:: Smoking status: unknown.                                                       

                                                                                                  

                                                                                                  

Screenin:32 Abuse screen: Denies threats or abuse. Denies injuries from another. Nutritional        rr5 

      screening: No deficits noted. Tuberculosis screening: No symptoms or risk factors           

      identified. Fall Risk Fall in past 12 months (25 points). Gait- Impaired (20 pts.).         

      Total Nguyễn Fall Scale indicates High Risk Score (45 or more points). Fall prevention       

      measures have been instituted. Side Rails Up X 2 Placed Close to Nursing Station            

      Frequent Obs/Assessments Occuring As available patient and family educated on Fall          

      Prevention Program and Strategies.                                                          

                                                                                                  

Assessment:                                                                                       

21:45 General: Appears in no apparent distress. comfortable, Behavior is calm, cooperative,   rr5 

      appropriate for age. Pain: Denies pain. Neuro: Level of Consciousness is awake, alert,      

      obeys commands, Oriented to person, place. Cardiovascular: Capillary refill < 3 seconds     

      Patient's skin is warm and dry. Respiratory: Airway is patent Respiratory effort is         

      even, unlabored, Respiratory pattern is regular, symmetrical. GI: No signs and/or           

      symptoms were reported involving the gastrointestinal system. : No signs and/or           

      symptoms were reported regarding the genitourinary system. EENT: No signs and/or            

      symptoms were reported regarding the EENT system. Derm: Skin is intact, is fragile, is      

      thin, Skin temperature is warm Bruising that is dark purple, on right arm.                  

      Musculoskeletal: Capillary refill < 3 seconds.                                              

22:30 Reassessment: Patient appears in no apparent distress at this time. No changes from     rr5 

      previously documented assessment.                                                           

23:27 Reassessment: Patient and/or family updated on plan of care and expected duration. Pain ea  

      level reassessed. Pt resting with eyes closed, respirations even and unlabored, chest       

      expansions even and symmetrical. No s/s of pain or discomfort noted at this time.           

                                                                                             

00:25 Reassessment: family member [phone] informed and updated for the status of the       rr5 

      patient with verbal consent by the patient.                                                 

00:37 Reassessment: Patient and/or family updated on plan of care and expected duration. Pain ea  

      level reassessed. Patient is alert, oriented x 3, equal unlabored respirations, skin        

      warm/dry/pink.                                                                              

01:00 Reassessment: Patient appears in no apparent distress at this time. Patient is alert,   rr5 

      oriented x 3, equal unlabored respirations, skin warm/dry/pink. ongoing potassium drip      

      no complaints made.                                                                         

01:15 Reassessment: patient pulled the IV line, he does not want to continue the K drip. I    rr5 

      want to go home as stated by the patient. ED provider informed and spoke to patient,        

      insist he does not want to continuie.                                                       

01:30 Reassessment: Patient and/or family updated on plan of care and expected duration. Pain ea  

      level reassessed. Patient is alert, oriented x 3, equal unlabored respirations, skin        

      warm/dry/pink. Pt discontinued own IV, states "I don't want that potassium anymore"         

      pressure dressing applied, bleeding controlled, IV catheter intact.                         

01:50 Reassessment: Patient appears in no apparent distress at this time. Patient is alert,   rr5 

      oriented x 3, equal unlabored respirations, skin warm/dry/pink. report given to [facility]       

      awake alert vitally stable. no complaints made.                                             

                                                                                                  

Vital Signs:                                                                                      

                                                                                             

21:28  / 100 LA Supine (auto/pedi); Pulse 90 MON; Resp 16 S; Temp 98.2(O); Pulse Ox 96% ds4 

      on R/A;                                                                                     

22:35  / 99; Pulse 87; Resp 17; Pulse Ox 99% on R/A; Pain 0/10;                         rr5 

                                                                                             

00:00  / 70; Pulse 80; Resp 17; Pulse Ox 98% on R/A;                                    rr5 

01:00  / 93; Pulse 85; Resp 19; Pulse Ox 98% ;                                          rr5 

01:46  / 75; Pulse 79; Resp 17; Temp 98; Pulse Ox 100% ;                                rr5 

                                                                                                  

ED Course:                                                                                        

                                                                                             

21:26 Patient arrived in ED.                                                                  lp1 

21:28 Julio César Jasso MD is Attending Physician.                                            tw4 

21:32 Pankaj Hernández, RN is Primary Nurse.                                                    rr5 

21:32 Patient has correct armband on for positive identification. Placed in gown. Bed in low  rr5 

      position. Call light in reach. Side rails up X2. Pulse ox on. NIBP on.                      

21:36 Sujit Mcgrath PA is PHCP.                                                                cp  

21:39 Arm band placed on right wrist. Patient placed in an exam room, on a stretcher, on      ea  

      pulse oximetry.                                                                             

21:41 Triage completed.                                                                       ea  

22:37 XRAY Humerus RIGHT In Process Unspecified.                                              EDMS

22:37 XRAY Forearm RIGHT In Process Unspecified.                                              EDMS

22:45 Inserted saline lock: 20 gauge in left forearm, using aseptic technique. Blood          rr5 

      collected.                                                                                  

                                                                                             

00:10 EKG done, by ED staff, reviewed by Sujit BOYLE.                                       rr5 

01:15 No provider procedures requiring assistance completed. IV discontinued, intact,         rr5 

      bleeding controlled, No redness/swelling at site. Pressure dressing applied, removed by     

      the patient.                                                                                

                                                                                                  

Administered Medications:                                                                         

00:07 Drug: Potassium Chloride 20 mEq Route: IV; Rate: calculated rate; Site: left forearm;   rr5 

01:15 Follow up: Response: No adverse reaction; IV Status: Order to discontinue infusion; IV  rr5 

      Intake: 25ml ; discontinuedby the patient                                                   

00:29 Drug: Potassium Effervescent Tablet 50 mEq Route: PO;                                   rr5 

00:59 Follow up: Response: No adverse reaction                                                ea  

01:30 CANCELLED (Patient Refused): Potassium Effervescent Tablet 50 mEq PO once; dissolve in  ea  

      4 ounces of water or juice                                                                  

                                                                                                  

                                                                                                  

Intake:                                                                                           

01:15 IV: 25ml; Total: 25ml.                                                                  rr5 

                                                                                                  

Outcome:                                                                                          

01:25 Discharge ordered by MD.                                                                cp  

01:47 Discharged to home via ambulance.                                                       rr5 

01:47 Condition: stable                                                                           

01:47 Discharge instructions given to patient, Instructed on discharge instructions, follow       

      up and referral plans. Demonstrated understanding of instructions, follow-up care.          

01:57 Patient left the ED.                                                                    rr5 

                                                                                                  

Signatures:                                                                                       

Dispatcher [employer]                           EDMS                                                 

Erma Leslie, RN                         RN   lp1                                                  

Herson Eli                             ds4                                                  

Sujit Mcgrath PA PA cp Antunez, Elena, RN RN ea Wadley, Terrence, MD MD   tw4                                                  

Pankaj Hernández RN                      RN   rr5                                                  

                                                                                                  

**************************************************************************************************

## 2020-06-26 ENCOUNTER — HOSPITAL ENCOUNTER (EMERGENCY)
Dept: HOSPITAL 97 - ER | Age: 85
LOS: 1 days | Discharge: HOME | End: 2020-06-27
Payer: COMMERCIAL

## 2020-06-26 DIAGNOSIS — Z79.82: ICD-10-CM

## 2020-06-26 DIAGNOSIS — N39.0: Primary | ICD-10-CM

## 2020-06-26 DIAGNOSIS — Z88.2: ICD-10-CM

## 2020-06-26 LAB
ALBUMIN SERPL BCP-MCNC: 2.5 G/DL (ref 3.4–5)
ALP SERPL-CCNC: 71 U/L (ref 45–117)
ALT SERPL W P-5'-P-CCNC: 15 U/L (ref 12–78)
AST SERPL W P-5'-P-CCNC: 18 U/L (ref 15–37)
BUN BLD-MCNC: 17 MG/DL (ref 7–18)
GLUCOSE SERPLBLD-MCNC: 95 MG/DL (ref 74–106)
HCT VFR BLD CALC: 31.6 % (ref 39.6–49)
LIPASE SERPL-CCNC: 196 U/L (ref 73–393)
LYMPHOCYTES # SPEC AUTO: 1.2 K/UL (ref 0.7–4.9)
PMV BLD: 8 FL (ref 7.6–11.3)
POTASSIUM SERPL-SCNC: 3.3 MMOL/L (ref 3.5–5.1)
RBC # BLD: 3.64 M/UL (ref 4.33–5.43)

## 2020-06-26 PROCEDURE — 96374 THER/PROPH/DIAG INJ IV PUSH: CPT

## 2020-06-26 PROCEDURE — 85025 COMPLETE CBC W/AUTO DIFF WBC: CPT

## 2020-06-26 PROCEDURE — 80048 BASIC METABOLIC PNL TOTAL CA: CPT

## 2020-06-26 PROCEDURE — 81003 URINALYSIS AUTO W/O SCOPE: CPT

## 2020-06-26 PROCEDURE — 83690 ASSAY OF LIPASE: CPT

## 2020-06-26 PROCEDURE — 99284 EMERGENCY DEPT VISIT MOD MDM: CPT

## 2020-06-26 PROCEDURE — 74177 CT ABD & PELVIS W/CONTRAST: CPT

## 2020-06-26 PROCEDURE — 36415 COLL VENOUS BLD VENIPUNCTURE: CPT

## 2020-06-26 PROCEDURE — 80076 HEPATIC FUNCTION PANEL: CPT

## 2020-06-27 VITALS — SYSTOLIC BLOOD PRESSURE: 145 MMHG | DIASTOLIC BLOOD PRESSURE: 95 MMHG | TEMPERATURE: 98.5 F | OXYGEN SATURATION: 97 %

## 2020-06-27 NOTE — EDPHYS
Physician Documentation                                                                           

 [facility]                                                                 

Name: Brett Hernandez                                                                                 

Age: 84 yrs                                                                                       

Sex: Male                                                                                         

: 1935                                                                                   

MRN: [id number]                                                                                   

Arrival Date: 2020                                                                          

Time: 22:16                                                                                       

Account#: [id number]                                                                            

Bed 19                                                                                            

Private MD:                                                                                       

ED Physician Bam Devlin                                                                      

HPI:                                                                                              

                                                                                             

23:04 This 84 yrs old  Male presents to ER via EMS with complaints of Diarrhea.      mh7 

23:04 The patient presents to the emergency department with diarrhea, that is intermittent.   mh7 

      Onset: The symptoms/episode began/occurred yesterday. Possible causes: unknown. The         

      symptoms are aggravated by nothing. The symptoms are alleviated by nothing. Associated      

      signs and symptoms: Pertinent positives: abdominal pain, Pertinent negatives: anorexia,     

      belching, constipation, dysuria, fever, flatulence, GI bleeding, hematuria, nausea,         

      vomiting. Severity of symptoms: At their worst the symptoms were moderate yesterday, in     

      the emergency department the symptoms have improved moderately.                             

                                                                                                  

Historical:                                                                                       

- Allergies:                                                                                      

22:37 Sulfa (Sulfonamide Antibiotics);                                                        fu  

- Home Meds:                                                                                      

22:37 aspirin 81 mg Oral chew 1 tab once daily [Active]; b12 daily [Active];                  fu  

                                                                                                  

- Immunization history:: Adult Immunizations unknown.                                             

- Social history:: Smoking status: unknown.                                                       

                                                                                                  

                                                                                                  

ROS:                                                                                              

23:04 Constitutional: Negative for fever, chills, and weight loss, Eyes: Negative for injury, mh7 

      pain, redness, and discharge, ENT: Negative for injury, pain, and discharge, Neck:          

      Negative for injury, pain, and swelling, Cardiovascular: Negative for chest pain,           

      palpitations, and edema, Respiratory: Negative for shortness of breath, cough,              

      wheezing, and pleuritic chest pain, Back: Negative for injury and pain, : Negative        

      for injury, bleeding, discharge, and swelling, MS/Extremity: Negative for injury and        

      deformity, Skin: Negative for injury, rash, and discoloration, Neuro: Negative for          

      headache, weakness, numbness, tingling, and seizure, Psych: Negative for depression,        

      anxiety, suicide ideation, homicidal ideation, and hallucinations, Allergy/Immunology:      

      Negative for hives, rash, and allergies, Endocrine: Negative for neck swelling,             

      polydipsia, polyuria, polyphagia, and marked weight changes, Hematologic/Lymphatic:         

      Negative for swollen nodes, abnormal bleeding, and unusual bruising.                        

                                                                                                  

Exam:                                                                                             

23:04 Constitutional:  This is a well developed, well nourished patient who is awake, alert,  mh7 

      and in no acute distress. Head/Face:  Normocephalic, atraumatic. Eyes:  Pupils equal        

      round and reactive to light, extra-ocular motions intact.  Lids and lashes normal.          

      Conjunctiva and sclera are non-icteric and not injected.  Cornea within normal limits.      

      Periorbital areas with no swelling, redness, or edema. Neck:  Trachea midline, no           

      thyromegaly or masses palpated, and no cervical lymphadenopathy.  Supple, full range of     

      motion without nuchal rigidity, or vertebral point tenderness.  No Meningismus.             

      Chest/axilla:  Normal chest wall appearance and motion.  Nontender with no deformity.       

      No lesions are appreciated. Cardiovascular:  Regular rate and rhythm with a normal S1       

      and S2.  No gallops, murmurs, or rubs.  Normal PMI, no JVD.  No pulse deficits.             

      Respiratory:  Lungs have equal breath sounds bilaterally, clear to auscultation and         

      percussion.  No rales, rhonchi or wheezes noted.  No increased work of breathing, no        

      retractions or nasal flaring.                                                               

23:04 Back:  No spinal tenderness.  No costovertebral tenderness.  Full range of motion.          

      Skin:  Warm, dry with normal turgor.  Normal color with no rashes, no lesions, and no       

      evidence of cellulitis. Psych:  Awake, alert, with orientation to person, place and         

      time.  Behavior, mood, and affect are within normal limits.                                 

23:04 Abdomen/GI: Inspection: abdomen appears normal, Bowel sounds: normal, in all quadrants,     

      Palpation: mild abdominal tenderness, in the suprapubic area, Rectal exam: the exam is      

      deferred, because of patient request, Indicators: McBurney's point is not tender,           

      Hemphill's sign is negative, Rovsing's sign is negative, Obturator sign is negative,          

      Psoas sign is negative, Liver: no appreciated palpable abnormalities, Hernia: not           

      appreciated.                                                                                

23:04 Neuro: Orientation: appropriate for stated age, Mentation: appropriate for stated age,      

      Memory: appropriate for stated age, Cranial nerves: grossly normal, Cerebellar              

      function: is grossly normal based on the patient's age.                                     

                                                                                                  

Vital Signs:                                                                                      

22:33  / 89; Pulse 85; Resp 19; Temp 97.8; Pulse Ox 96% on R/A; Pain 0/10;              fu  

22:35  / 89; Pulse 85; Resp 19; Temp 97.8; Pulse Ox 96% ; Pain 0/10;                    fu  

                                                                                             

00:04  / 93; Pulse 96; Resp 19; Temp 98.9(T); Pulse Ox 96% on R/A; Pain 0/10;           fu  

00:58  / 91; Pulse 94; Resp 18; Temp 97.4; Pulse Ox 96% on R/A; Pain 0/10;              fu  

02:08  / 96; Pulse 96; Resp 15; Pulse Ox 95% on R/A; Pain 0/10;                         fu  

03:00  / 98; Pulse 85; Pulse Ox 94% on R/A; Pain 0/10;                                  fu  

04:00  / 95; Pulse 97; Resp 16; Temp 98.5(TE); Pulse Ox 97% on R/A; Pain 0/10;          fu  

                                                                                                  

MDM:                                                                                              

                                                                                             

22:42 Patient medically screened.                                                             [facility] 

                                                                                             

04:30 Differential diagnosis: Nonspecific abd pain, gastritis, viral gastroenteritis,         [facility] 

      gastroenteritis, UTI. Data reviewed: vital signs, nurses notes, EMS record, lab test        

      result(s), CBC, electrolytes, urinalysis, EKG, radiologic studies, CT scan. Data            

      interpreted: Pulse oximetry: on room air is 97 %. Interpretation: normal. Counseling: I     

      had a detailed discussion with the patient and/or guardian regarding: the historical        

      points, exam findings, and any diagnostic results supporting the discharge/admit            

      diagnosis, the presence of at least one elevated blood pressure reading (>120/80)           

      during this emergency department visit, lab results, radiology results, the need for        

      outpatient follow up, to return to the emergency department if symptoms worsen or           

      persist or if there are any questions or concerns that arise at home. Response to           

      treatment: the patient's symptoms have resolved after treatment, the patient's blood        

      pressure is in an acceptable range, mental status has returned to baseline, the patient     

      no longer shows bradycardia, the patient is not short of breath, the patient is not         

      tachycardic, the patient's pain is gone, the patient's temperature has normalized.          

                                                                                                  

                                                                                             

22:42 Order name: Basic Metabolic Panel; Complete Time: 00:00                                 [facility] 

                                                                                             

22:42 Order name: CBC with Diff; Complete Time: 00:00                                         [facility] 

                                                                                             

22:42 Order name: Hepatic Function; Complete Time: 00:00                                      [facility] 

                                                                                             

22:42 Order name: Lipase; Complete Time: 00:00                                                [facility] 

                                                                                             

23:42 Order name: Urine Dipstick--Ancillary (enter results); Complete Time: 00:00             tt3 

                                                                                             

00:00 Order name: CT Abd/Pelvis - IV Contrast Only                                            [facility] 

                                                                                             

22:42 Order name: IV Saline Lock; Complete Time: 23:05                                        [facility] 

                                                                                             

22:42 Order name: Labs collected and sent; Complete Time: 23:05                               [facility] 

                                                                                             

22:42 Order name: Urine Dipstick-Ancillary (obtain specimen); Complete Time: 23:45            [facility] 

                                                                                                  

Administered Medications:                                                                         

                                                                                             

23:09 Drug: NS 0.9% 1000 ml Route: IV; Rate: 1000 ml; Site: left antecubital;                   

                                                                                             

00:09 Follow up: Response: No adverse reaction                                                  

00:28 Follow up: IV Intake: 1000ml                                                            fu  

04:05 Drug: Rocephin - (cefTRIAXone) 1 grams Route: IVPB; Infused Over: 30 mins; Site: left   fu  

      antecubital;                                                                                

04:28 Follow up: Response: No adverse reaction                                                  

                                                                                                  

                                                                                                  

Disposition:                                                                                      

20 04:32 Discharged to Home. Impression: Urinary tract infection, site not specified,       

  Diarrhea, unspecified.                                                                          

- Condition is Stable.                                                                            

- Discharge Instructions: Diarrhea, Adult, Urinary Tract Infection, Adult.                        

- Prescriptions for Keflex 500 mg Oral Capsule - take 1 capsule by ORAL route every 12            

  hours for 7 days; 14 capsule.                                                                   

- SBAR form, Medication Reconciliation Form, Thank You Letter, Antibiotic Education,              

  Prescription Opioid Use form.                                                                   

- Follow up: Private Physician; When: 1 - 2 days; Reason: Worsening of condition,                 

  Recheck today's complaints, Re-evaluation by your physician.                                    

- Problem is new.                                                                                 

- Symptoms have improved.                                                                         

                                                                                                  

                                                                                                  

                                                                                                  

Signatures:                                                                                       

Dispatcher [employer]                           EDMS                                                 

Bishop Corona RN                         RN   Steve Becker RN RN fu Holmes, Maurice, MD MD   7                                                  

                                                                                                  

Corrections: (The following items were deleted from the chart)                                    

05:41 04:32 2020 04:32 Discharged to Home. Impression: Urinary tract infection, site    sg  

      not specified; Diarrhea, unspecified. Condition is Stable. Forms are Medication             

      Reconciliation Form, Thank You Letter, Antibiotic Education, Prescription Opioid Use.       

      Follow up: Private Physician; When: 1 - 2 days; Reason: Worsening of condition, Recheck     

      today's complaints, Re-evaluation by your physician. Problem is new. Symptoms have          

      improved. mh7                                                                               

                                                                                                  

**************************************************************************************************

## 2020-06-27 NOTE — RAD REPORT
EXAM DESCRIPTION:  CT ABDOMEN AND PELVIS WITH CONTRAST.

 

CLINICAL HISTORY:  Abdominal pain.

 

COMPARISON:  None.

 

TECHNIQUE:  Axial CT imaging of the abdomen and pelvis performed with intravenous contrast. Reformatt
ed coronal and sagittal images reviewed.

A dose reduction technique was utilized with automated exposure control according to patient size.

 

FINDINGS:

Small right and trace left pleural effusion. Heart is enlarged. Small pericardial effusion.

The liver is enlarged to greater than 21 cm. There is no liver mass or biliary dilatation. Unremarkab
le gallbladder. The spleen contains several calcified granulomas. Normal pancreas.

There is a 16.7 x 13.0 x 12.5 cm hypodense heterogeneous solid mass arising from the superior pole of
 the right kidney cortex extending superiorly causing asymmetric elevation of the right hemidiaphragm
. This is also significantly displacing the right lobe of the liver anteriorly. There are a few cysts
 within the inferior and posterior right kidney. The larger is exophytic posteriorly, 3.1 cm. There i
s an exophytic 5.4 cm superior left renal cyst. Smaller cysts are seen throughout the remaining left 
kidney. There is a 3 mm nonobstructing left renal cortical stone. No hydronephrosis.

Extensive atherosclerosis within the abdominal aorta. No aneurysm. Mesenteric vessels are well-opacif
ied. Inferior vena cava appears normal. No retroperitoneal lymphadenopathy.

Normal stomach and small bowel loops appear normal. Normal appendix in the right lower quadrant. Unre
markable colon. No ascites or free air.

There is mild bladder wall thickening. The prostate is 5.2 x 3.8 x 5.6 cm. No perinephric fluid.

There is 9 mm degenerative anterior subluxation of L4 on L5. Moderate lower thoracic and lumbar spond
ylosis. Intact bony pelvis. Intact hips.

 

IMPRESSION:  1. Very large exophytic superior right renal mass up to 16.7 cm causing anterior displac
ement of the liver and elevation of the right hemidiaphragm compatible with malignancy. There is no e
vidence of distant metastatic disease.

2. Bilateral renal cysts. Nonobstructing left nephrolith.

3. Bladder wall is thickened to just of cystitis. Infiltrating process is also within differential bu
t considered less likely.

4. Hepatomegaly.

5. Prostatomegaly.

6. Small right and trace left pleural effusion. Small pericardial effusion. Cardiomegaly.

 

Electronically signed by:   Diana Alonzo DO   6/27/2020 2:26 AM CDT Workstation: [id number]

 

 

Due to temporary technical issues with the PACS/Fluency reporting system, reports are being signed by
 the in house radiologist without review as a courtesy to ensure prompt reporting. The interpreting r
adiologist is fully responsible for the content of the report.

## 2020-06-27 NOTE — ER
Nurse's Notes                                                                                     

 [facility]                                                                 

Name: Brett Hernandez                                                                                 

Age: 84 yrs                                                                                       

Sex: Male                                                                                         

: 1935                                                                                   

MRN: [id number]                                                                                   

Arrival Date: 2020                                                                          

Time: 22:16                                                                                       

Account#: [id number]                                                                            

Bed 19                                                                                            

Private MD:                                                                                       

Diagnosis: Urinary tract infection, site not specified;Diarrhea, unspecified                      

                                                                                                  

Presentation:                                                                                     

                                                                                             

22:33 Chief complaint: EMS states: diarrhea since 1am today. Coronavirus screen: Patient      fu  

      denies a cough. Patient denies shortness of breath or difficulty breathing. Patient         

      denies measured and/or subjective temperature greater than 100.4F prior to today's          

      visit. Patient denies travel on a cruise ship or to a country the [facility] currently lists       

      as an affected area. Patient denies contact with known and/or suspected case of             

      COVID-19. Ebola Screen: No symptoms or risks identified at this time.                       

22:33 Method Of Arrival: EMS: [facility]                                                fu  

22:35 Initial Sepsis Screen: Does the patient meet any 2 criteria? No. Patient's initial      fu  

      sepsis screen is negative. Does the patient have a suspected source of infection?. Risk     

      Assessment: Do you want to hurt yourself or someone else? Patient reports no desire to      

      harm self or others. Onset of symptoms was 2020.                                   

22:35 Acuity: VITO 3                                                                           fu  

                                                                                                  

Historical:                                                                                       

- Allergies:                                                                                      

22:37 Sulfa (Sulfonamide Antibiotics);                                                        fu  

- Home Meds:                                                                                      

22:37 aspirin 81 mg Oral chew 1 tab once daily [Active]; b12 daily [Active];                  fu  

                                                                                                  

- Immunization history:: Adult Immunizations unknown.                                             

- Social history:: Smoking status: unknown.                                                       

                                                                                                  

                                                                                                  

Screenin:39 Abuse screen: Denies threats or abuse. Nutritional screening: No deficits noted.        fu  

      Tuberculosis screening: No symptoms or risk factors identified. Fall Risk None              

      identified.                                                                                 

                                                                                                  

Assessment:                                                                                       

22:37 General: Appears in no apparent distress. Behavior is calm, cooperative, appropriate    fu  

      for age, Denies fever, chills. Pain: Denies pain. GI: Abdomen is round Bowel sounds         

      present X 4 quads. Reports diarrhea. : No signs and/or symptoms were reported             

      regarding the genitourinary system. Musculoskeletal: contractures right arm.                

                                                                                             

00:04 Reassessment: Patient appears in no apparent distress at this time. No changes from     fu  

      previously documented assessment. Patient and/or family updated on plan of care and         

      expected duration. Pain level reassessed. Patient is alert, oriented x 3, equal             

      unlabored respirations, skin warm/dry/pink. bruising to left forearm noted.                 

01:00 Reassessment: Patient appears in no apparent distress at this time. No changes from     fu  

      previously documented assessment. Patient and/or family updated on plan of care and         

      expected duration. Pain level reassessed. Patient is alert, oriented x 3, equal             

      unlabored respirations, skin warm/dry/pink.                                                 

                                                                                                  

Vital Signs:                                                                                      

                                                                                             

22:33  / 89; Pulse 85; Resp 19; Temp 97.8; Pulse Ox 96% on R/A; Pain 0/10;              fu  

22:35  / 89; Pulse 85; Resp 19; Temp 97.8; Pulse Ox 96% ; Pain 0/10;                    fu  

                                                                                             

00:04  / 93; Pulse 96; Resp 19; Temp 98.9(T); Pulse Ox 96% on R/A; Pain 0/10;           fu  

00:58  / 91; Pulse 94; Resp 18; Temp 97.4; Pulse Ox 96% on R/A; Pain 0/10;              fu  

02:08  / 96; Pulse 96; Resp 15; Pulse Ox 95% on R/A; Pain 0/10;                         fu  

03:00  / 98; Pulse 85; Pulse Ox 94% on R/A; Pain 0/10;                                  fu  

04:00  / 95; Pulse 97; Resp 16; Temp 98.5(TE); Pulse Ox 97% on R/A; Pain 0/10;          fu  

                                                                                                  

ED Course:                                                                                        

                                                                                             

22:16 Patient arrived in ED.                                                                  cl3 

22:19 Steve Mcallister, [facility] is Primary Nurse.                                                    fu  

22:19 Bam Devlin MD is Attending Physician.                                             mh7 

22:35 Triage completed.                                                                       fu  

22:39 Patient has correct armband on for positive identification. Bed in low position. Side   fu  

      rails up X2. Pulse ox on. NIBP on.                                                          

23:05 Basic Metabolic Panel Sent.                                                             fu  

23:05 CBC with Diff Sent.                                                                     fu  

23:05 Hepatic Function Sent.                                                                  fu  

23:05 Lipase Sent.                                                                            fu  

23:05 Inserted saline lock: 20 gauge in left antecubital area, using aseptic technique. Blood fu  

      collected.                                                                                  

                                                                                             

00:05 No provider procedures requiring assistance completed.                                  fu  

02:00 Patient placed in an exam room.                                                         ao  

02:08 CT Abd/Pelvis - IV Contrast Only In Process Unspecified.                                EDMS

04:29 Report given to [facility] Strange.                                                               fu  

05:57 IV discontinued, intact, bleeding controlled, No redness/swelling at site. Pressure     ao  

      dressing applied.                                                                           

                                                                                                  

Administered Medications:                                                                         

                                                                                             

23:09 Drug: NS 0.9% 1000 ml Route: IV; Rate: 1000 ml; Site: left antecubital;                 fu  

                                                                                             

00:09 Follow up: Response: No adverse reaction                                                fu  

00:28 Follow up: IV Intake: 1000ml                                                            fu  

04:05 Drug: Rocephin - (cefTRIAXone) 1 grams Route: IVPB; Infused Over: 30 mins; Site: left   fu  

      antecubital;                                                                                

04:28 Follow up: Response: No adverse reaction                                                fu  

                                                                                                  

                                                                                                  

Intake:                                                                                           

00:28 IV: 1000ml; Total: 1000ml.                                                              fu  

                                                                                                  

Outcome:                                                                                          

04:32 Discharge ordered by MD.                                                                mh7 

05:41 Patient left the ED.                                                                    sg  

05:56 Discharged to home ambulatory.                                                          ao  

05:56 Condition: stable                                                                           

05:56 Discharge instructions given to patient, Instructed on discharge instructions, follow       

      up and referral plans. Demonstrated understanding of instructions, follow-up care,          

      medications, Prescriptions given X 1.                                                       

                                                                                                  

Signatures:                                                                                       

Dispatcher [employer]                           Bishop Esquivel RN RN sg Ortiz, Alex, RN RN ao Umadhay, Felix, RN RN fu Lewis, Charde                                cl3                                                  

Bam Devlin MD MD   7                                                  

                                                                                                  

**************************************************************************************************